# Patient Record
Sex: FEMALE | Race: AMERICAN INDIAN OR ALASKA NATIVE
[De-identification: names, ages, dates, MRNs, and addresses within clinical notes are randomized per-mention and may not be internally consistent; named-entity substitution may affect disease eponyms.]

---

## 2022-05-26 ENCOUNTER — HOSPITAL ENCOUNTER (INPATIENT)
Dept: HOSPITAL 5 - ED | Age: 77
LOS: 6 days | Discharge: HOME HEALTH | DRG: 64 | End: 2022-06-01
Attending: INTERNAL MEDICINE | Admitting: INTERNAL MEDICINE
Payer: MEDICARE

## 2022-05-26 DIAGNOSIS — N39.0: ICD-10-CM

## 2022-05-26 DIAGNOSIS — M10.9: ICD-10-CM

## 2022-05-26 DIAGNOSIS — I10: ICD-10-CM

## 2022-05-26 DIAGNOSIS — G81.91: ICD-10-CM

## 2022-05-26 DIAGNOSIS — Z82.49: ICD-10-CM

## 2022-05-26 DIAGNOSIS — E78.00: ICD-10-CM

## 2022-05-26 DIAGNOSIS — Z79.899: ICD-10-CM

## 2022-05-26 DIAGNOSIS — Z90.710: ICD-10-CM

## 2022-05-26 DIAGNOSIS — G92.8: ICD-10-CM

## 2022-05-26 DIAGNOSIS — R65.10: ICD-10-CM

## 2022-05-26 DIAGNOSIS — E78.5: ICD-10-CM

## 2022-05-26 DIAGNOSIS — E11.9: ICD-10-CM

## 2022-05-26 DIAGNOSIS — F01.50: ICD-10-CM

## 2022-05-26 DIAGNOSIS — Z83.3: ICD-10-CM

## 2022-05-26 DIAGNOSIS — I63.89: Primary | ICD-10-CM

## 2022-05-26 DIAGNOSIS — E86.9: ICD-10-CM

## 2022-05-26 DIAGNOSIS — R29.704: ICD-10-CM

## 2022-05-26 DIAGNOSIS — I72.8: ICD-10-CM

## 2022-05-26 LAB
ALBUMIN SERPL-MCNC: 3.9 G/DL (ref 3.9–5)
ALT SERPL-CCNC: 15 UNITS/L (ref 7–56)
APTT BLD: 31.5 SEC. (ref 24.2–36.6)
BASOPHILS # (AUTO): 0.1 K/MM3 (ref 0–0.1)
BASOPHILS NFR BLD AUTO: 0.7 % (ref 0–1.8)
BUN SERPL-MCNC: 32 MG/DL (ref 7–17)
BUN/CREAT SERPL: 29 %
CALCIUM SERPL-MCNC: 9.1 MG/DL (ref 8.4–10.2)
EOSINOPHIL # BLD AUTO: 0.3 K/MM3 (ref 0–0.4)
EOSINOPHIL NFR BLD AUTO: 2.4 % (ref 0–4.3)
HCT VFR BLD CALC: 43.3 % (ref 30.3–42.9)
HEMOLYSIS INDEX: 11
HGB BLD-MCNC: 13.7 GM/DL (ref 10.1–14.3)
INR PPP: 0.96 (ref 0.87–1.13)
LYMPHOCYTES # BLD AUTO: 2.7 K/MM3 (ref 1.2–5.4)
LYMPHOCYTES NFR BLD AUTO: 21.5 % (ref 13.4–35)
MCHC RBC AUTO-ENTMCNC: 32 % (ref 30–34)
MCV RBC AUTO: 83 FL (ref 79–97)
MONOCYTES # (AUTO): 0.9 K/MM3 (ref 0–0.8)
MONOCYTES % (AUTO): 7.1 % (ref 0–7.3)
PLATELET # BLD: 258 K/MM3 (ref 140–440)
RBC # BLD AUTO: 5.24 M/MM3 (ref 3.65–5.03)

## 2022-05-26 PROCEDURE — 81001 URINALYSIS AUTO W/SCOPE: CPT

## 2022-05-26 PROCEDURE — 83036 HEMOGLOBIN GLYCOSYLATED A1C: CPT

## 2022-05-26 PROCEDURE — 80061 LIPID PANEL: CPT

## 2022-05-26 PROCEDURE — 93880 EXTRACRANIAL BILAT STUDY: CPT

## 2022-05-26 PROCEDURE — 85670 THROMBIN TIME PLASMA: CPT

## 2022-05-26 PROCEDURE — 70450 CT HEAD/BRAIN W/O DYE: CPT

## 2022-05-26 PROCEDURE — 82962 GLUCOSE BLOOD TEST: CPT

## 2022-05-26 PROCEDURE — 71045 X-RAY EXAM CHEST 1 VIEW: CPT

## 2022-05-26 PROCEDURE — 85025 COMPLETE CBC W/AUTO DIFF WBC: CPT

## 2022-05-26 PROCEDURE — 84484 ASSAY OF TROPONIN QUANT: CPT

## 2022-05-26 PROCEDURE — 80048 BASIC METABOLIC PNL TOTAL CA: CPT

## 2022-05-26 PROCEDURE — 70496 CT ANGIOGRAPHY HEAD: CPT

## 2022-05-26 PROCEDURE — 80053 COMPREHEN METABOLIC PANEL: CPT

## 2022-05-26 PROCEDURE — 70498 CT ANGIOGRAPHY NECK: CPT

## 2022-05-26 PROCEDURE — 85730 THROMBOPLASTIN TIME PARTIAL: CPT

## 2022-05-26 PROCEDURE — 84443 ASSAY THYROID STIM HORMONE: CPT

## 2022-05-26 PROCEDURE — 82607 VITAMIN B-12: CPT

## 2022-05-26 PROCEDURE — 87040 BLOOD CULTURE FOR BACTERIA: CPT

## 2022-05-26 PROCEDURE — 85610 PROTHROMBIN TIME: CPT

## 2022-05-26 PROCEDURE — 93306 TTE W/DOPPLER COMPLETE: CPT

## 2022-05-26 PROCEDURE — 70551 MRI BRAIN STEM W/O DYE: CPT

## 2022-05-26 PROCEDURE — 36415 COLL VENOUS BLD VENIPUNCTURE: CPT

## 2022-05-26 PROCEDURE — C8929 TTE W OR WO FOL WCON,DOPPLER: HCPCS

## 2022-05-26 RX ADMIN — Medication SCH ML: at 21:38

## 2022-05-26 NOTE — HISTORY AND PHYSICAL REPORT
History of Present Illness


Chief complaint: 


She is confused and weak today





History of present illness: 


75 YO Female with CVA with RHP on Antiplatelet therapy, HTN, HLD, Gout, Vascular

Dementia, Cerebral Atherosclerosis, DM presents ED for evaluation.  Patient has 

diminished cognition and is unable to provide detailed history.  Patient history

provided by patient caregivers who are at bedside during exam and interview.  As

per caregiver the patient was found to have increased weakness and confusion 

today.  Caregivers reported that they suspected the patient may have had another

stroke.  Patient transported to [facility] via private vehicle for the care and 

evaluation of the aforementioned symptoms.  The patient was seen and evaluated 

in the emergency department.  All lab and imaging studies reviewed.  Patient 

found to have urinary tract infection complicated by systemic inflammatory 

response syndrome, metabolic encephalopathy, as well as volume depletion.  

Patient initiated on CVA protocol without evidence of new CVA.  Teleneurology 

consulted.  No reports of fever, chills, chest pain, palpitation, productive c

ough, skin rash, recent contact, known exposure to COVID-19.  No prior admission

for review.  All medication listed at time of admission as reconciled.  Advanced

care planning conducted in ED.  Patient has diminished cognition but has a 

positive gag reflex and is able to protect her airway without difficulty at the 

time my evaluation.





Past History


Past Medical History: diabetes, hypertension, hyperlipidemia, stroke


Past Surgical History: hysterectomy


Social history: .  denies: smoking, alcohol abuse, prescription drug 

abuse


Family history: diabetes, hypertension





Medications and Allergies


                                    Allergies











Allergy/AdvReac Type Severity Reaction Status Date / Time


 


No Known Allergies Allergy   Verified 07/07/15 10:51











                                Home Medications











 Medication  Instructions  Recorded  Confirmed  Last Taken  Type


 


Aspirin [Aspirin BABY CHEW TAB] 81 mg DAILY 01/25/21 01/25/21 01/24/21 History


 


Metformin HCl [metFORMIN  mg PO BID 01/25/21 01/25/21 01/24/21 History





Osmotic]     


 


lisinopriL [Zestril TAB] 20 mg PO QDAY 01/25/21 01/25/21 01/24/21 History


 


ALBUTEROL NEB's [Proventil 0.083% 2.5 mg IH Q3HRT PRN  nebu 01/28/21  Unknown Rx





NEBS]     


 


Amlodipine Besylate [Norvasc] 10 mg PO DAILY #30 tab 01/28/21  Unknown Rx


 


AtorvaSTATin 10 mg PO QHS #30 tab 01/28/21 01/25/21 01/24/21 Rx


 


Insulin Glargine [Lantus VIAL] 30 units SUB-Q QHS #15 ml 01/28/21  Unknown Rx


 


Lispro Insulin [HumaLOG] 12 unit SUB-Q AC #15 ml 01/28/21  Unknown Rx


 


Syringe & Needle,[facility] 01/28/21  Unknown 

Rx





[Ultra Comfort]     


 


allopurinoL [Zyloprim] 300 mg PO QDAY #30 tab 01/28/21 01/25/21 01/24/21 Rx


 


Insulin Glargine [Lantus VIAL] 30 units SUB-Q QHS #15 ml 02/01/21  Unknown Rx


 


Lispro Insulin [HumaLOG] 12 unit SQ AC #15 ml 02/01/21  Unknown Rx


 


Syringe & Needle,Insulin,1 ml 1 each  ACHS #100 disp.syrin 02/01/21  Unknown 

Rx





[Ultra Comfort]     











Active Meds: 


Active Medications





Sodium Chloride (Nacl 0.9% 1000 Ml)  1,000 mls @ 999 mls/hr IV BOLUS ONE


   Stop: 05/26/22 21:26


Ceftriaxone Sodium (Rocephin/Ns 1 Gm/50 Ml)  1 gm in 50 mls @ 100 mls/hr IV ONCE

ONE


   Stop: 05/26/22 21:29











Review of Systems


ROS unobtainable: due to mental status





Exam





- Constitutional


Vitals: 


                                        











Temp Pulse Resp BP Pulse Ox


 


 97 F L  97 H  18   186/89   96 


 


 05/26/22 19:03  05/26/22 19:03  05/26/22 19:03  05/26/22 19:03  05/26/22 19:03











General appearance: Present: mild distress





- EENT


Eyes: Present: PERRL


ENT: hearing intact, clear oral mucosa





- Neck


Neck: Present: supple





- Respiratory


Respiratory effort: normal


Respiratory: bilateral: diminished





- Cardiovascular


Heart Sounds: Present: S1 & S2.  Absent: rub, click





- Extremities


Extremities: pulses symmetrical, No edema


Peripheral Pulses: within normal limits





- Abdominal


General gastrointestinal: Present: soft, non-tender, non-distended, normal bowel

sounds


Female genitourinary: Present: normal





- Integumentary


Integumentary: Present: clear, dry, clammy





- Musculoskeletal


Musculoskeletal: right sided weakness





- Psychiatric


Psychiatric: no appropriate mood/affect, no intact judgment & insight, no memory

intact





- Neurologic


Neurologic: CNII-XII intact, focal deficits, no gait normal





HEART Score





- HEART Score


Troponin: 


                                        











Troponin T  < 0.010 ng/mL (0.00-0.029)   05/26/22  19:41    














Results





- Labs


CBC & Chem 7: 


                                 05/26/22 19:41





                                 05/26/22 19:41


Labs: 


                              Abnormal lab results











  05/26/22 05/26/22 05/26/22 Range/Units





  19:41 19:41 19:41 


 


WBC  12.7 H    (4.5-11.0)  K/mm3


 


RBC  5.24 H    (3.65-5.03)  M/mm3


 


Hct  43.3 H    (30.3-42.9)  %


 


MCH  26 L    (28-32)  pg


 


Mono # (Auto)  0.9 H    (0.0-0.8)  K/mm3


 


Seg Neutrophils #  8.7 H    (1.8-7.7)  K/mm3


 


Thrombin Time   21.1 H   (15.1-19.6)  Sec.


 


Chloride    110.2 H  ()  mmol/L


 


BUN    32 H  (7-17)  mg/dL


 


Glucose    134 H  ()  mg/dL














Assessment and Plan





- Patient Problems


(1) Systemic inflammatory response syndrome


Status: Acute   


Plan to address problem: 


CBC, CMP, urinalysis, chest x-ray, IV fluid resuscitation therapy, IV antibiotic

therapy, supportive care.








(2) UTI (urinary tract infection)


Status: Acute   


Qualifiers: 


   Encounter type: initial encounter 


Plan to address problem: 


CBC, urinalysis, IV antibiotic therapy, repeat CBC in a.m.








(3) Metabolic encephalopathy


Status: Acute   


Plan to address problem: 


CT head, neuro check, seizure precaution, aspiration precaution, fall 

precautions








(4) Vascular dementia


Status: Acute   


Qualifiers: 


   Dementia behavioral disturbance: without behavioral disturbance   Qualified 

Code(s): F01.50 - Vascular dementia without behavioral disturbance   


Plan to address problem: 


Verbal prompting, verbal redirection, benzodiazepine therapy as clinically 

indicated.








(5) Cerebral atherosclerosis


Status: Acute   


Plan to address problem: 


Risk factor reduction, antiplatelet therapy, supportive care.








(6) Volume depletion


Status: Acute   


Plan to address problem: 


IV fluid resuscitation therapy, monitor urine output every shift, monitor fluid 

balance.








(7) DVT prophylaxis


Status: Acute   


Plan to address problem: 


SCD to bilateral lower extremities while in bed








(8) Advance care planning


Status: Acute   


Plan to address problem: 


Disease education done, care plan discussed, diagnoses discussed, prognosis 

discussed, patient is full code.  Patient caregivers acknowledged understanding 

and agreement with care plan, +30 minutes.








(9) Preventative health care


Status: Acute   


Plan to address problem: 


Patient caregiver was counseled regarding risk factor reduction, home safety, 

outpatient follow-up with primary care physician for all age and risk factor 

appropriate screening test.

## 2022-05-26 NOTE — CAT SCAN REPORT
CT head/brain wo con



INDICATION:

acute stroke.



TECHNIQUE: Routine CT head. All CT scans at this location are performed using CT dose reduction for A
DAVION by means of automated exposure control.



COMPARISON: 

January 25 2021



FINDINGS:



Intracranial: Gray-white matter differentiation is maintained. No intracranial hemorrhage. No extra a
xial collection. No hydrocephalus. No herniation. In the left cerebellar lacunar remote basal ganglia
, right thalamic, and left cerebellar lacunar infarctions. Significant periventricular and centrum se
miovale white matter hypoattenuation most consistent with sequela of chronic microvascular disease. E
ncephalomalacia in the right frontal lobe and right insular lobe from prior infarctions.

Sinuses: Significant mucosal thickening seen within the left maxillary sinus with suspected underlyin
g mucous retention cysts.

Orbits: Globes are intact. 

Calvarium: No acute fracture.





IMPRESSION:

1.  No acute intracranial abnormality.



2. Severe sequela from chronic microvascular disease and remote multifocal infarctions.



Signer Name: Quan Campos MD 

Signed: 5/26/2022 7:50 PM

Workstation Name: [id number]

## 2022-05-26 NOTE — EMERGENCY DEPARTMENT REPORT
Blank Doc





- Documentation


Documentation: 





[facility] Consult Note





# Demographics


Consult Type: Acute Stroke Level 2 (4.5-24 hrs)





Patient Location: Emergency Room





First Name: You





Last Name: Cristian





YOB: 1945





Age: 76





Gender: Female





Facility: [facility]





Time of Initial Page (Eastern Time): 05/26/2022, 19:12





Time of Return Call (Eastern Time): 05/26/2022, 19:12








# HPI


History: 2100 last night was last well. Some slurred speech and difficulty 

ambulating








# Scores


Time of exam and NIHSS (Eastern Time): 05/26/2022, 19:40





Level of Consciousness 1a: [0] = Alert; keenly responsive





LOC Questions 1b: [2] = Answers neither correctly





LOC Commands 1c: [0] = Performs both tasks correctly





Best Gaze 2: [0] = Normal





Visual 3: [0] = No visual loss





Facial Palsy 4: [1] = Minor paralysis





Motor Arm Left 5a: [0] = No drift





Motor Arm Right 5b: [0] = No drift





Motor Leg Left 6a: [0] = No drift





Motor Leg Right 6b: [0] = No drift





Limb Ataxia 7: [0] = Absent





Sensory 8: [0] = Normal





Best Language 9: [0] = No aphasia





Dysarthria 10: [1] = Mild-to-moderate dysarthria





Extinction and Inattention 11: [0] = No abnormality





NIHSS Total: 4








# Assessment


Impression:


Altered Mental Status








# Plan


Thrombolytic/Intervention: NOT IV Thrombolysis or IA Intervention candidate





Thrombolytic Exclusion: > 4.5 hours





Intraarterial Exclusion:


clinically not consistent with stroke





Other:


I have discussed my recommendations with the referring provider





Additional Recommendations: Metabolic and infectious evaluation recommended. If 

no cause found then MRI brain and EEG would be reasonable for next step in 

evaluation








# Logistics


Telemedicine: Interactive 2 way audio and visual telecommunication technology 

was utilized during this visit

## 2022-05-26 NOTE — EMERGENCY DEPARTMENT REPORT
HPI





- General


Chief Complaint: Neuro Symptoms/Deficit


Time Seen by Provider: 05/26/22 19:15





- HPI


HPI: 


Time last known well was May 25, 2022 at 9 PM.  Apparently since this morning 

the caretakers thought that there is something wrong with her because her m

entation is not the same and she seems confused and having trouble speaking with

trouble getting her words out and also some slurring of the words.  The patient 

is usually able to walk and today has not been able to.  It is difficult to 

communicate with the patient but she denies any current complaints and does not 

know why she is in the emergency department.  She does deny nausea vomiting 

fever chills headache or any other associated symptoms.





ED Past Medical Hx





- Past Medical History


Hx Hypertension: Yes


Hx Diabetes: Yes


Hx Dementia: Yes (vascular dementia)


Additional medical history: Gout, high cholesterol





- Surgical History


Past Surgical History?: Yes


Additional Surgical History: hysterectomy





- Family History


Family history: no significant





- Social History


Smoking Status: Never Smoker


Substance Use Type: None





- Medications


Home Medications: 


                                Home Medications











 Medication  Instructions  Recorded  Confirmed  Last Taken  Type


 


Aspirin [Aspirin BABY CHEW TAB] 81 mg DAILY 01/25/21 01/25/21 01/24/21 History


 


Metformin HCl [metFORMIN  mg PO BID 01/25/21 01/25/21 01/24/21 History





Osmotic]     


 


lisinopriL [Zestril TAB] 20 mg PO QDAY 01/25/21 01/25/21 01/24/21 History


 


ALBUTEROL NEB's [Proventil 0.083% 2.5 mg IH Q3HRT PRN  nebu 01/28/21  Unknown Rx





NEBS]     


 


Amlodipine Besylate [Norvasc] 10 mg PO DAILY #30 tab 01/28/21  Unknown Rx


 


AtorvaSTATin 10 mg PO QHS #30 tab 01/28/21 01/25/21 01/24/21 Rx


 


Insulin Glargine [Lantus VIAL] 30 units SUB-Q QHS #15 ml 01/28/21  Unknown Rx


 


Lispro Insulin [HumaLOG] 12 unit SUB-Q AC #15 ml 01/28/21  Unknown Rx


 


Syringe & Needle,Insulin,1 ml 1 each  [facility] #100 disp.syrin 01/28/21  Unknown 

Rx





[Ultra Comfort]     


 


allopurinoL [Zyloprim] 300 mg PO QDAY #30 tab 01/28/21 01/25/21 01/24/21 Rx


 


Insulin Glargine [Lantus VIAL] 30 units SUB-Q QHS #15 ml 02/01/21  Unknown Rx


 


Lispro Insulin [HumaLOG] 12 unit SQ AC #15 ml 02/01/21  Unknown Rx


 


Syringe & Needle,Insulin,1 ml 1 each  [facility] #100 disp.syrin 02/01/21  Unknown 

Rx





[Ultra Comfort]     














ED Review of Systems


ROS: 


Stated complaint: POSS STOKE


Other details as noted in HPI





Other: 





All systems reviewed and negative





Physical Exam





- Physical Exam


Vital Signs: 


                                   Vital Signs











  05/26/22





  19:03


 


Temperature 97 F L


 


Pulse Rate 97 H


 


Respiratory 18





Rate 


 


Blood Pressure 186/89





[Left] 


 


O2 Sat by Pulse 96





Oximetry 











Physical Exam: 





Physical Exam:


Constitutional: AAOX3. No acute distress. No diaphoresis. 


HENT: Normocephalic. Pupils equal and reactive. No throat edema or erythema.


Neck: No neck rigidity or tenderness.


Cardiovascular: 


Heart sounds: No murmur. Normal rate and regular rhythm. 


Pulses: Intact distal pulses. 


Lungs: No wheezing or rales. 


Chest wall: No tenderness. 


Abdominal: No distension. No mass/pulsatile mass. No abdominal tenderness, 

guarding nor rebound. 


Musculoskeletal: Normal range of motion. No edema, No calf TTP.


Skin: Warm and dry. 


Neurological: Alert and oriented to person, place, and time.  NIH stroke scale 

equals 5 for mild right facial paralysis with a flat nasolabial fold, ataxia in 

bilateral upper extremities, mild to moderate aphasia, mild dysarthria.


Psychiatric: Mood and affect normal. Normal cognition and memory. Normal 

judgement.








ED Course


                                   Vital Signs











  05/26/22





  19:03


 


Temperature 97 F L


 


Pulse Rate 97 H


 


Respiratory 18





Rate 


 


Blood Pressure 186/89





[Left] 


 


O2 Sat by Pulse 96





Oximetry 














- Reevaluation(s)


Reevaluation #1: 





05/26/22 19:24


At 1920 I spoke to Rosmery Reddy, the telemetry neurologist who will be 

evaluated the patient once her CAT scan of the brain comes back.


Reevaluation #2: 





05/26/22 20:25


I spoke to the telemetry neurologist who did not think that this was necessarily

 a stroke.  Anyway she is currently out of the tPA window.  She diagnosed her 

with altered mental status.  At this time her chest x-ray is normal her 

chemistries are all within normal limits I am awaiting on a urinalysis that we 

can a catheter for and then she will be admitted for altered mental status.


05/26/22 20:26





Reevaluation #3: 





05/26/22 20:55


We are awaiting a urine since her white count is elevated.  I spoke to Dr. Montero

 who will be placing admission orders.  Likely this could be metabolic but a she

 could have had a small stroke.





ED Medical Decision Making





- Lab Data


Result diagrams: 


                                 05/26/22 19:41





                                 05/26/22 19:41


Critical care attestation.: 


If time is entered above; I have spent that time in minutes in the direct care 

of this critically ill patient, excluding procedure time.








ED Disposition


Clinical Impression: 


 Altered mental status





Disposition: [facility]M [facility]


Is pt being admited?: Yes


Does the pt Need Aspirin: Yes


Condition: Stable

## 2022-05-26 NOTE — XRAY REPORT
CHEST 1 VIEW 5/26/2022 7:51 PM



INDICATION / CLINICAL INFORMATION: stroke.



COMPARISON: 1/25/2021



FINDINGS:



SUPPORT DEVICES: None.



HEART / MEDIASTINUM: No significant abnormality. 



LUNGS / PLEURA: No significant pulmonary or pleural abnormality. No pneumothorax. 



ADDITIONAL FINDINGS: No significant additional findings.



IMPRESSION:

1. No acute findings.



Signer Name: Sammy Busby MD 

Signed: 5/26/2022 8:08 PM

Workstation Name: [id number]

## 2022-05-27 LAB
BASOPHILS # (AUTO): 0 K/MM3 (ref 0–0.1)
BASOPHILS NFR BLD AUTO: 0.5 % (ref 0–1.8)
BILIRUB UR QL STRIP: (no result)
BLOOD UR QL VISUAL: (no result)
BUN SERPL-MCNC: 29 MG/DL (ref 7–17)
BUN/CREAT SERPL: 29 %
CALCIUM SERPL-MCNC: 8.5 MG/DL (ref 8.4–10.2)
EOSINOPHIL # BLD AUTO: 0.3 K/MM3 (ref 0–0.4)
EOSINOPHIL NFR BLD AUTO: 2.9 % (ref 0–4.3)
HCT VFR BLD CALC: 41 % (ref 30.3–42.9)
HEMOLYSIS INDEX: 61
HGB BLD-MCNC: 13.1 GM/DL (ref 10.1–14.3)
LYMPHOCYTES # BLD AUTO: 2 K/MM3 (ref 1.2–5.4)
LYMPHOCYTES NFR BLD AUTO: 18.9 % (ref 13.4–35)
MCHC RBC AUTO-ENTMCNC: 32 % (ref 30–34)
MCV RBC AUTO: 83 FL (ref 79–97)
MONOCYTES # (AUTO): 0.8 K/MM3 (ref 0–0.8)
MONOCYTES % (AUTO): 7.2 % (ref 0–7.3)
PH UR STRIP: 5 [PH] (ref 5–7)
PLATELET # BLD: 247 K/MM3 (ref 140–440)
PROT UR STRIP-MCNC: (no result) MG/DL
RBC # BLD AUTO: 4.95 M/MM3 (ref 3.65–5.03)
RBC #/AREA URNS HPF: < 1 /HPF (ref 0–6)
UROBILINOGEN UR-MCNC: < 2 MG/DL (ref ?–2)
WBC #/AREA URNS HPF: < 1 /HPF (ref 0–6)

## 2022-05-27 RX ADMIN — INSULIN LISPRO SCH: 100 INJECTION, SOLUTION INTRAVENOUS; SUBCUTANEOUS at 17:14

## 2022-05-27 RX ADMIN — INSULIN LISPRO SCH UNIT: 100 INJECTION, SOLUTION INTRAVENOUS; SUBCUTANEOUS at 12:36

## 2022-05-27 RX ADMIN — SODIUM CHLORIDE SCH MLS/HR: 0.9 INJECTION, SOLUTION INTRAVENOUS at 07:31

## 2022-05-27 RX ADMIN — INSULIN LISPRO SCH: 100 INJECTION, SOLUTION INTRAVENOUS; SUBCUTANEOUS at 02:17

## 2022-05-27 RX ADMIN — LISINOPRIL SCH MG: 20 TABLET ORAL at 09:55

## 2022-05-27 RX ADMIN — SODIUM CHLORIDE SCH MLS/HR: 0.9 INJECTION, SOLUTION INTRAVENOUS at 23:24

## 2022-05-27 RX ADMIN — Medication SCH ML: at 21:53

## 2022-05-27 RX ADMIN — Medication SCH ML: at 09:55

## 2022-05-27 RX ADMIN — INSULIN LISPRO SCH: 100 INJECTION, SOLUTION INTRAVENOUS; SUBCUTANEOUS at 08:07

## 2022-05-27 NOTE — PROGRESS NOTE
Assessment and Plan


Assessment and plan: 





77 YO Female with CVA with RHP on Antiplatelet therapy, HTN, HLD, Gout, Vascular

Dementia, Cerebral Atherosclerosis, DM presents ED for evaluation of diminished 

cognition and was unable to provide detailed history.  Patient's history 

provided by patient caregivers who are at bedside during exam and interview.  As

per caregiver the patient was found to have increased weakness and confusion 

today.  Caregivers reported that they suspected the patient may have had another

stroke.  Patient transported to [facility] via private vehicle for the care and 

evaluation of the aforementioned symptoms.  The patient was seen and evaluated 

in the emergency department.  The patient was admitted with diagnosis below:





SIRS.  Patient meets criteria given the tachycardia, leukocytosis and altered 

mentation.  No signs of infection





Metabolic encephalopathy





Vascular dementia





Cerebral atherosclerosis





5/27/2022.  Urinalysis does not reveal any signs of infection.  We will follow-

up blood cultures for further evaluation.  Continue empiric antibiotics for now.

 We will proceed with further follow-up and rule out CVA.  Check MRI of brain.  

Patient appears to be back to her baseline mental status.  ?  TIA.





History


Interval history: 





No new issues overnight.





Hospitalist Physical





- Constitutional


Vitals: 


                                        











Temp Pulse Resp BP Pulse Ox


 


 97 F L  79   20   164/63   98 


 


 05/26/22 19:03  05/27/22 01:15  05/27/22 01:15  05/27/22 01:15  05/27/22 08:48











General appearance: Present: no acute distress





- EENT


Eyes: Present: PERRL, EOM intact


ENT: hearing intact, clear oral mucosa, dentition normal





- Neck


Neck: Present: supple, normal ROM





- Respiratory


Respiratory effort: normal


Respiratory: bilateral: CTA





- Cardiovascular


Rhythm: regular


Heart Sounds: Present: S1 & S2.  Absent: gallop, rub





- Extremities


Extremities: no ischemia, No edema, Full ROM





- Abdominal


General gastrointestinal: soft, non-tender, non-distended, normal bowel sounds





- Integumentary


Integumentary: Present: clear, warm, dry





- Neurologic


Neurologic: CNII-XII intact, moves all extremities





HEART Score





- HEART Score


Troponin: 


                                        











Troponin T  < 0.010 ng/mL (0.00-0.029)   05/26/22  19:41    














Results





- Labs


CBC & Chem 7: 


                                 05/27/22 05:23





                                 05/27/22 05:23


Labs: 


                             Laboratory Last Values











WBC  10.4 K/mm3 (4.5-11.0)   05/27/22  05:23    


 


RBC  4.95 M/mm3 (3.65-5.03)   05/27/22  05:23    


 


Hgb  13.1 gm/dl (10.1-14.3)   05/27/22  05:23    


 


Hct  41.0 % (30.3-42.9)   05/27/22  05:23    


 


MCV  83 fl (79-97)   05/27/22  05:23    


 


MCH  27 pg (28-32)  L  05/27/22  05:23    


 


MCHC  32 % (30-34)   05/27/22  05:23    


 


RDW  14.2 % (13.2-15.2)   05/27/22  05:23    


 


Plt Count  247 K/mm3 (140-440)   05/27/22  05:23    


 


Lymph % (Auto)  18.9 % (13.4-35.0)   05/27/22  05:23    


 


Mono % (Auto)  7.2 % (0.0-7.3)   05/27/22  05:23    


 


Eos % (Auto)  2.9 % (0.0-4.3)   05/27/22  05:23    


 


Baso % (Auto)  0.5 % (0.0-1.8)   05/27/22  05:23    


 


Lymph # (Auto)  2.0 K/mm3 (1.2-5.4)   05/27/22  05:23    


 


Mono # (Auto)  0.8 K/mm3 (0.0-0.8)   05/27/22  05:23    


 


Eos # (Auto)  0.3 K/mm3 (0.0-0.4)   05/27/22  05:23    


 


Baso # (Auto)  0.0 K/mm3 (0.0-0.1)   05/27/22  05:23    


 


Seg Neutrophils %  70.5 % (40.0-70.0)  H  05/27/22  05:23    


 


Seg Neutrophils #  7.3 K/mm3 (1.8-7.7)   05/27/22  05:23    


 


PT  13.8 Sec. (12.2-14.9)   05/26/22  19:41    


 


INR  0.96  (0.87-1.13)   05/26/22  19:41    


 


APTT  31.5 Sec. (24.2-36.6)   05/26/22  19:41    


 


Thrombin Time  21.1 Sec. (15.1-19.6)  H  05/26/22  19:41    


 


Sodium  144 mmol/L (137-145)   05/27/22  05:23    


 


Potassium  4.7 mmol/L (3.6-5.0)   05/27/22  05:23    


 


Chloride  112.8 mmol/L ()  H  05/27/22  05:23    


 


Carbon Dioxide  22 mmol/L (22-30)   05/27/22  05:23    


 


Anion Gap  14 mmol/L  05/27/22  05:23    


 


BUN  29 mg/dL (7-17)  H  05/27/22  05:23    


 


Creatinine  1.0 mg/dL (0.6-1.2)   05/27/22  05:23    


 


Estimated GFR  > 60 ml/min  05/27/22  05:23    


 


BUN/Creatinine Ratio  29 %  05/27/22  05:23    


 


Glucose  135 mg/dL ()  H  05/27/22  05:23    


 


POC Glucose  128 mg/dL ()  H  05/27/22  05:50    


 


Calcium  8.5 mg/dL (8.4-10.2)   05/27/22  05:23    


 


Total Bilirubin  0.30 mg/dL (0.1-1.2)   05/26/22  19:41    


 


AST  15 units/L (5-40)   05/26/22  19:41    


 


ALT  15 units/L (7-56)   05/26/22  19:41    


 


Alkaline Phosphatase  94 units/L ()   05/26/22  19:41    


 


Troponin T  < 0.010 ng/mL (0.00-0.029)   05/26/22  19:41    


 


Total Protein  7.0 g/dL (6.3-8.2)   05/26/22  19:41    


 


Albumin  3.9 g/dL (3.9-5)   05/26/22  19:41    


 


Albumin/Globulin Ratio  1.3 %  05/26/22  19:41    


 


Urine Color  Straw  (Yellow)   05/27/22  09:00    


 


Urine Turbidity  Clear  (Clear)   05/27/22  09:00    


 


Urine pH  5.0  (5.0-7.0)   05/27/22  09:00    


 


Ur Specific Gravity  1.012  (1.003-1.030)   05/27/22  09:00    


 


Urine Protein  <15 mg/dl mg/dL (Negative)   05/27/22  09:00    


 


Urine Glucose (UA)  >=500 mg/dL (Negative)   05/27/22  09:00    


 


Urine Ketones  Neg mg/dL (Negative)   05/27/22  09:00    


 


Urine Blood  Neg  (Negative)   05/27/22  09:00    


 


Urine Nitrite  Neg  (Negative)   05/27/22  09:00    


 


Urine Bilirubin  Neg  (Negative)   05/27/22  09:00    


 


Urine Urobilinogen  < 2.0 mg/dL (<2.0)   05/27/22  09:00    


 


Ur Leukocyte Esterase  Neg  (Negative)   05/27/22  09:00    


 


Urine WBC (Auto)  < 1.0 /HPF (0.0-6.0)   05/27/22  09:00    


 


Urine RBC (Auto)  < 1.0 /HPF (0.0-6.0)   05/27/22  09:00    


 


U Epithel Cells (Auto)  3.0 /HPF (0-13.0)   05/27/22  09:00    











Microbiology: 


Microbiology





05/26/22 20:47   Peripheral/Venous   Blood Culture - Preliminary


                            Culture in Progress


05/26/22 20:47   Peripheral/Venous   Blood Culture - Preliminary


                            Culture in Progress








[place]/IV: 


                                        





Voiding Method                   External Female Catheter











Active Medications





- Current Medications


Current Medications: 














Generic Name Dose Route Start Last Admin





  Trade Name Freq  PRN Reason Stop Dose Admin


 


Acetaminophen  650 mg  05/26/22 20:55  05/26/22 21:37





  Acetaminophen 325 Mg Tab  PO   650 mg





  Q4H PRN   Administration





  Pain MILD(1-3)/Fever >100.5/HA  


 


Albuterol  2.5 mg  05/26/22 20:55 





  Albuterol 2.5 Mg/3 Ml Nebu  IH  





  Q4HRT PRN  





  Shortness Of Breath  


 


Allopurinol  300 mg  05/27/22 10:00  05/27/22 09:55





  Allopurinol 300 Mg Tab  PO   300 mg





  QDAY KARLIE   Administration


 


Amlodipine Besylate  10 mg  05/27/22 10:00  05/27/22 09:55





  Amlodipine 10 Mg Tab  PO   10 mg





  DAILY KARLIE   Administration


 


Atorvastatin Calcium  10 mg  05/26/22 22:00  05/27/22 02:49





  Atorvastatin 10 Mg Tab  PO   Not Given





  QHS KARLIE  


 


Dextrose  50 ml  05/26/22 20:58 





  Dextrose 50% In Water (25gm) 50 Ml Syringe  IV  





  Q30MIN PRN  





  Hypoglycemia  





  Protocol  


 


Hydromorphone HCl  0.5 mg  05/26/22 20:55 





  Hydromorphone 0.5 Mg/0.5 Ml Inj  IV  





  Q23H PRN  





  Pain , Severe (7-10)  


 


Sodium Chloride  1,000 mls @ 75 mls/hr  05/26/22 21:00  05/27/22 07:31





  Nacl 0.9% 1000 Ml  IV   75 mls/hr





  AS DIRECT KARLIE   Administration


 


Levofloxacin/Dextrose  500 mg in 100 mls @ 100 mls/hr  05/26/22 21:00  05/27/22 

02:50





  Levaquin 500mg/100ml  IV   100 mls/hr





  Q24H KARLIE   Infusion





  Protocol  


 


Insulin Human Lispro  0 unit  05/27/22 00:00  05/27/22 08:07





  Insulin Lispro 100 Unit/Ml  SUB-Q   Not Given





  Q6HR KARLIE  





  Protocol  


 


Lisinopril  20 mg  05/27/22 10:00  05/27/22 09:55





  Lisinopril 20 Mg Tab  PO   20 mg





  QDAY KARLIE   Administration


 


Ondansetron HCl  4 mg  05/26/22 20:55  05/26/22 21:35





  Ondansetron 4 Mg/2 Ml Inj  IV   4 mg





  Q8H PRN   Administration





  Nausea And Vomiting  


 


Oxycodone/Acetaminophen  1 tab  05/26/22 20:55 





  Oxycodone /Acetaminophen 5-325mg Tab  PO  





  Q16H PRN  





  Pain, Moderate (4-6)  


 


Sodium Chloride  10 ml  05/26/22 22:00  05/27/22 09:55





  Sodium Chloride 0.9% 10 Ml Flush Syringe  IV   10 ml





  BID KARLIE   Administration


 


Sodium Chloride  10 ml  05/26/22 20:55 





  Sodium Chloride 0.9% 10 Ml Flush Syringe  IV  





  PRN PRN  





  LINE FLUSH

## 2022-05-27 NOTE — MAGNETIC RESONANCE REPORT
MR brain wo con



INDICATION / CLINICAL INFORMATION:

76 years Female; AMS, r/o CVA. 



TECHNIQUE: 

Multiplanar, multisequence MR images of the brain were obtained. 



COMPARISON: 

None available.



FINDINGS:



BRAIN / INTRACRANIAL CONTENTS: There is extensive cerebral and pontine white matter disease most cons
istent with microvascular angiopathy. Additionally, there are areas of septal malacia involving right
 frontal, parietal and occipital lobes most consistent with old infarcts. More focal findings are see
n along the posterior left occipital lobes as well as within the basal ganglia and thalami.



If there is increase diffusion signal along the more anterior left thalamus measuring 1.4 cm greatest
 transverse dimension compatible with acute/subacute infarct. The diffusion imaging is otherwise unre
markable. Old infarct is seen within the left sari as well as along the posterior cerebellar hemisphe
res bilaterally.



There is moderate cerebral atrophy with associated prominence of the ventricular system. No extra-axi
al fluid collections or significant mass effect is identified. There also appear to be asymmetric chr
onic ischemic changes along the inferior right temporal lobe with relative atrophy of the right hippo
campus.



CRANIOCERVICAL JUNCTION: No significant abnormality.

VASCULAR FLOW-VOIDS: The intracranial ICAs and vertebrobasilar system grossly demonstrate appropriate
 signal voids.



ORBITS: No significant abnormality of visualized orbits.

SINUSES / MASTOIDS: There is heterogeneous signal involving medial left maxillary sinus extending to 
the adjacent left nasal cavity which may be on inflammatory basis though correlation would be needed 
given the heterogeneous signal. There is more homogeneous increased T2-weighted signal within the rem
aining left maxillary sinus most consistent with associated inflammatory process and mucosal thickeni
ng. Nodular heterogeneous signal extends within the posterior left nasal cavity.



ADDITIONAL FINDINGS: None. 



IMPRESSION:

1. There is a 1.4 cm acute infarct along the anterior left thalamus as detailed above.

2. There is otherwise extensive chronic microvascular angiopathy and multiple old infarcts, also desc
ribed above.

3. There is heterogeneous opacification of the left maxillary sinus with component extending within t
he adjacent left nasal cavity as described



Signer Name: Mikey Patricio MD 

Signed: 5/27/2022 2:58 PM

Workstation Name: [id number]

## 2022-05-28 LAB — HDLC SERPL-MCNC: 31 MG/DL (ref 40–59)

## 2022-05-28 RX ADMIN — INSULIN LISPRO SCH UNIT: 100 INJECTION, SOLUTION INTRAVENOUS; SUBCUTANEOUS at 00:23

## 2022-05-28 RX ADMIN — SODIUM CHLORIDE SCH MLS/HR: 0.9 INJECTION, SOLUTION INTRAVENOUS at 17:07

## 2022-05-28 RX ADMIN — INSULIN LISPRO SCH UNIT: 100 INJECTION, SOLUTION INTRAVENOUS; SUBCUTANEOUS at 12:00

## 2022-05-28 RX ADMIN — INSULIN LISPRO SCH UNIT: 100 INJECTION, SOLUTION INTRAVENOUS; SUBCUTANEOUS at 06:03

## 2022-05-28 RX ADMIN — Medication SCH ML: at 21:04

## 2022-05-28 RX ADMIN — INSULIN LISPRO SCH UNIT: 100 INJECTION, SOLUTION INTRAVENOUS; SUBCUTANEOUS at 18:00

## 2022-05-28 RX ADMIN — ASPIRIN SCH MG: 325 TABLET, COATED ORAL at 10:03

## 2022-05-28 RX ADMIN — LISINOPRIL SCH MG: 20 TABLET ORAL at 10:04

## 2022-05-28 RX ADMIN — Medication SCH ML: at 10:03

## 2022-05-28 NOTE — PROGRESS NOTE
Assessment and Plan


Assessment and plan: 





77 YO Female with CVA with RHP on Antiplatelet therapy, HTN, HLD, Gout, Vascular

Dementia, Cerebral Atherosclerosis, DM presents ED for evaluation of diminished 

cognition and was unable to provide detailed history.  Patient's history 

provided by patient caregivers who are at bedside during exam and interview.  As

per caregiver the patient was found to have increased weakness and confusion 

today.  Caregivers reported that they suspected the patient may have had another

stroke.  Patient transported to [facility] via private vehicle for the care and 

evaluation of the aforementioned symptoms.  The patient was seen and evaluated 

in the emergency department.  The patient was admitted with diagnosis below:





Acute left thalamic CVA.  





SIRS.  Patient meets criteria given the tachycardia, leukocytosis and altered 

mentation.  No signs of infection





Diabetes mellitus type 2





Hypertension.





Metabolic encephalopathy





Vascular dementia





Cerebral atherosclerosis





5/27/2022.  Urinalysis does not reveal any signs of infection.  We will follow-

up blood cultures for further evaluation.  Continue empiric antibiotics for now.

 We will proceed with further follow-up and rule out CVA.  Check MRI of brain.  

Patient appears to be back to her baseline mental status.  ?  TIA.





5/28/2022.  MRI reveals acute anterior left thalamic CVA measuring 1.4 cm.  We 

will initiate aspirin and Lipitor for secondary prevention.  Await 

echocardiogram results to rule out thromboembolic source.  Neurology has been 

consulted.  Await physical therapy evaluation.  Patient's BG has been stable not

requiring home Lantus insulin.  We will continue sliding scale regular insulin 

and monitor closely





History


Interval history: 





No new issues overnight.





Hospitalist Physical





- Constitutional


Vitals: 


                                        











Temp Pulse Resp BP Pulse Ox


 


 98.7 F   77   16   156/66   97 


 


 05/27/22 20:44  05/27/22 20:44  05/27/22 20:44  05/27/22 20:44  05/28/22 09:12











General appearance: Present: no acute distress





- EENT


Eyes: Present: PERRL, EOM intact


ENT: hearing intact, clear oral mucosa, dentition normal





- Neck


Neck: Present: supple, normal ROM





- Respiratory


Respiratory effort: normal


Respiratory: bilateral: CTA





- Cardiovascular


Rhythm: regular


Heart Sounds: Present: S1 & S2.  Absent: gallop, rub





- Extremities


Extremities: no ischemia, No edema, Full ROM





- Abdominal


General gastrointestinal: soft, non-tender, non-distended, normal bowel sounds





- Integumentary


Integumentary: Present: clear, warm, dry





- Neurologic


Neurologic: CNII-XII intact, moves all extremities





HEART Score





- HEART Score


Troponin: 


                                        











Troponin T  < 0.010 ng/mL (0.00-0.029)   05/26/22  19:41    














Results





- Labs


CBC & Chem 7: 


                                 05/27/22 05:23





                                 05/27/22 05:23


Labs: 


                             Laboratory Last Values











WBC  10.4 K/mm3 (4.5-11.0)   05/27/22  05:23    


 


RBC  4.95 M/mm3 (3.65-5.03)   05/27/22  05:23    


 


Hgb  13.1 gm/dl (10.1-14.3)   05/27/22  05:23    


 


Hct  41.0 % (30.3-42.9)   05/27/22  05:23    


 


MCV  83 fl (79-97)   05/27/22  05:23    


 


MCH  27 pg (28-32)  L  05/27/22  05:23    


 


MCHC  32 % (30-34)   05/27/22  05:23    


 


RDW  14.2 % (13.2-15.2)   05/27/22  05:23    


 


Plt Count  247 K/mm3 (140-440)   05/27/22  05:23    


 


Lymph % (Auto)  18.9 % (13.4-35.0)   05/27/22  05:23    


 


Mono % (Auto)  7.2 % (0.0-7.3)   05/27/22  05:23    


 


Eos % (Auto)  2.9 % (0.0-4.3)   05/27/22  05:23    


 


Baso % (Auto)  0.5 % (0.0-1.8)   05/27/22  05:23    


 


Lymph # (Auto)  2.0 K/mm3 (1.2-5.4)   05/27/22  05:23    


 


Mono # (Auto)  0.8 K/mm3 (0.0-0.8)   05/27/22  05:23    


 


Eos # (Auto)  0.3 K/mm3 (0.0-0.4)   05/27/22  05:23    


 


Baso # (Auto)  0.0 K/mm3 (0.0-0.1)   05/27/22  05:23    


 


Seg Neutrophils %  70.5 % (40.0-70.0)  H  05/27/22  05:23    


 


Seg Neutrophils #  7.3 K/mm3 (1.8-7.7)   05/27/22  05:23    


 


PT  13.8 Sec. (12.2-14.9)   05/26/22  19:41    


 


INR  0.96  (0.87-1.13)   05/26/22  19:41    


 


APTT  31.5 Sec. (24.2-36.6)   05/26/22  19:41    


 


Thrombin Time  21.1 Sec. (15.1-19.6)  H  05/26/22  19:41    


 


Sodium  144 mmol/L (137-145)   05/27/22  05:23    


 


Potassium  4.7 mmol/L (3.6-5.0)   05/27/22  05:23    


 


Chloride  112.8 mmol/L ()  H  05/27/22  05:23    


 


Carbon Dioxide  22 mmol/L (22-30)   05/27/22  05:23    


 


Anion Gap  14 mmol/L  05/27/22  05:23    


 


BUN  29 mg/dL (7-17)  H  05/27/22  05:23    


 


Creatinine  1.0 mg/dL (0.6-1.2)   05/27/22  05:23    


 


Estimated GFR  > 60 ml/min  05/27/22  05:23    


 


BUN/Creatinine Ratio  29 %  05/27/22  05:23    


 


Glucose  135 mg/dL ()  H  05/27/22  05:23    


 


POC Glucose  167 mg/dL ()  H  05/28/22  05:54    


 


Calcium  8.5 mg/dL (8.4-10.2)   05/27/22  05:23    


 


Total Bilirubin  0.30 mg/dL (0.1-1.2)   05/26/22  19:41    


 


AST  15 units/L (5-40)   05/26/22  19:41    


 


ALT  15 units/L (7-56)   05/26/22  19:41    


 


Alkaline Phosphatase  94 units/L ()   05/26/22  19:41    


 


Troponin T  < 0.010 ng/mL (0.00-0.029)   05/26/22  19:41    


 


Total Protein  7.0 g/dL (6.3-8.2)   05/26/22  19:41    


 


Albumin  3.9 g/dL (3.9-5)   05/26/22  19:41    


 


Albumin/Globulin Ratio  1.3 %  05/26/22  19:41    


 


Urine Color  Straw  (Yellow)   05/27/22  09:00    


 


Urine Turbidity  Clear  (Clear)   05/27/22  09:00    


 


Urine pH  5.0  (5.0-7.0)   05/27/22  09:00    


 


Ur Specific Gravity  1.012  (1.003-1.030)   05/27/22  09:00    


 


Urine Protein  <15 mg/dl mg/dL (Negative)   05/27/22  09:00    


 


Urine Glucose (UA)  >=500 mg/dL (Negative)   05/27/22  09:00    


 


Urine Ketones  Neg mg/dL (Negative)   05/27/22  09:00    


 


Urine Blood  Neg  (Negative)   05/27/22  09:00    


 


Urine Nitrite  Neg  (Negative)   05/27/22  09:00    


 


Urine Bilirubin  Neg  (Negative)   05/27/22  09:00    


 


Urine Urobilinogen  < 2.0 mg/dL (<2.0)   05/27/22  09:00    


 


Ur Leukocyte Esterase  Neg  (Negative)   05/27/22  09:00    


 


Urine WBC (Auto)  < 1.0 /HPF (0.0-6.0)   05/27/22  09:00    


 


Urine RBC (Auto)  < 1.0 /HPF (0.0-6.0)   05/27/22  09:00    


 


U Epithel Cells (Auto)  3.0 /HPF (0-13.0)   05/27/22  09:00    











Microbiology: 


Microbiology





05/26/22 20:47   Peripheral/Venous   Blood Culture - Preliminary


                            NO GROWTH AFTER 24 HOURS


05/26/22 20:47   Peripheral/Venous   Blood Culture - Preliminary


                            NO GROWTH AFTER 24 HOURS








[place]/IV: 


                                        





Voiding Method                   External Female Catheter











Active Medications





- Current Medications


Current Medications: 














Generic Name Dose Route Start Last Admin





  Trade Name Freq  PRN Reason Stop Dose Admin


 


Acetaminophen  650 mg  05/26/22 20:55  05/26/22 21:37





  Acetaminophen 325 Mg Tab  PO   650 mg





  Q4H PRN   Administration





  Pain MILD(1-3)/Fever >100.5/HA  


 


Albuterol  2.5 mg  05/26/22 20:55 





  Albuterol 2.5 Mg/3 Ml Nebu  IH  





  Q4HRT PRN  





  Shortness Of Breath  


 


Allopurinol  300 mg  05/27/22 10:00  05/28/22 10:08





  Allopurinol 300 Mg Tab  PO   300 mg





  QDAY KARLIE   Administration


 


Amlodipine Besylate  10 mg  05/27/22 10:00  05/28/22 10:03





  Amlodipine 10 Mg Tab  PO   10 mg





  DAILY KARLIE   Administration


 


Aspirin  325 mg  05/28/22 10:00  05/28/22 10:03





  Aspirin Ec 325 Mg Tab  PO   325 mg





  QDAY KARLIE   Administration


 


Atorvastatin Calcium  40 mg  05/28/22 22:00 





  Atorvastatin 40 Mg Tab  PO  





  QHS KARLIE  


 


Dextrose  50 ml  05/26/22 20:58 





  Dextrose 50% In Water (25gm) 50 Ml Syringe  IV  





  Q30MIN PRN  





  Hypoglycemia  





  Protocol  


 


Hydromorphone HCl  0.5 mg  05/26/22 20:55 





  Hydromorphone 0.5 Mg/0.5 Ml Inj  IV  





  Q23H PRN  





  Pain , Severe (7-10)  


 


Sodium Chloride  1,000 mls @ 75 mls/hr  05/26/22 21:00  05/27/22 23:24





  Nacl 0.9% 1000 Ml  IV   75 mls/hr





  AS DIRECT KARLIE   Administration


 


Levofloxacin/Dextrose  500 mg in 100 mls @ 100 mls/hr  05/26/22 21:00  05/27/22 

21:52





  Levaquin 500mg/100ml  IV   100 mls/hr





  Q24H KARLIE   Administration





  Protocol  


 


Insulin Human Lispro  0 unit  05/27/22 00:00  05/28/22 06:03





  Insulin Lispro 100 Unit/Ml  SUB-Q   2 unit





  Q6HR KARLIE   Administration





  Protocol  


 


Lisinopril  20 mg  05/27/22 10:00  05/28/22 10:04





  Lisinopril 20 Mg Tab  PO   20 mg





  QDAY KARLIE   Administration


 


Ondansetron HCl  4 mg  05/26/22 20:55  05/26/22 21:35





  Ondansetron 4 Mg/2 Ml Inj  IV   4 mg





  Q8H PRN   Administration





  Nausea And Vomiting  


 


Oxycodone/Acetaminophen  1 tab  05/26/22 20:55 





  Oxycodone /Acetaminophen 5-325mg Tab  PO  





  Q16H PRN  





  Pain, Moderate (4-6)  


 


Sodium Chloride  10 ml  05/26/22 22:00  05/28/22 10:03





  Sodium Chloride 0.9% 10 Ml Flush Syringe  IV   10 ml





  BID KARLIE   Administration


 


Sodium Chloride  10 ml  05/26/22 20:55 





  Sodium Chloride 0.9% 10 Ml Flush Syringe  IV  





  PRN PRN  





  LINE FLUSH

## 2022-05-28 NOTE — CAT SCAN REPORT
CT angio neck



INDICATION / CLINICAL INFORMATION:

76 years Female; cva. 



TECHNIQUE: Thin cut axial images obtained through the head during IV bolus contrast administration. S
agittal, coronal, and 3 plane MIP reconstructions performed by the technologist. NASCET type criteria
 used evaluate stenoses. All CT scans at this location are performed using CT dose reduction for ALAR
A by means of automated exposure control. 



COMPARISON:

None available.



FINDINGS: 



CAROTID ARTERIES: There is atherosclerotic calcification involving proximal left ICA with 50% stenosi
s by NASCET to criteria.



There is small focus of calcification involving proximal right ICA with mild plaque. There is no sign
ificant stenosis by NASCET criteria. The remaining cervical carotid arteries appear unremarkable.



VERTEBRAL ARTERIES: The beam hardening artifact obscures the proximal vertebral arteries. However, th
ere is no clear evidence of significant focal stenosis involving the cervical vertebral arteries. The
 left vertebral artery is dominant. 



ARCH: The origins of the arch vessels are also obscured by the dense contrast within the adjacent ankit
ous structures. However, there is no clear significant narrowing of the visualized origins of the arc
h vessels.



ADDITIONAL FINDINGS: There is heterogeneous prominence of the left lobe of thyroid gland compatible w
ith goiter and correlation would be needed. 



IMPRESSION:



There is atherosclerotic calcification involving proximal left ICA with 50% stenosis by NASCET criter
ia.



There is mild plaque involving right carotid bifurcation without significant stenosis.



Signer Name: Mikey Patricio MD 

Signed: 5/28/2022 3:30 PM

Workstation Name: [id number]

## 2022-05-28 NOTE — CAT SCAN REPORT
CT angio head



INDICATION / CLINICAL INFORMATION:

76 years Female; CVA. 



TECHNIQUE: Thin cut axial images obtained through the head during IV bolus contrast administration. S
agittal, coronal, and 3 plane MIP reconstructions performed by the technologist. NASCET type criteria
 used evaluate stenoses. Automated exposure control utilized for radiation reduction purposes. 



COMPARISON: 

None available.



FINDINGS:



INTERNAL CAROTID ARTERIES: There is notable extensive atherosclerotic calcification involving intracr
anial ICAs with moderate to marked segmental stenosis on the right. There is moderate stenosis involv
ing intracranial left ICA involving the cavernous segment.



VERTEBROBASILAR SYSTEM: There is notable irregularity of the vertebral basilar system with moderate n
arrowing of the distal basilar artery also indicative of atherosclerotic disease. There is mild narro
wing of the intracranial vertebral arteries and more proximal basilar artery.



CEREBRAL ARTERIES: There is occlusion of the P2 segment of the right PCA with old infarct and encepha
lomalacia within the right occipital lobe at. There is notable irregularity of the left PCA with mode
rate segmental narrowing again indicative of atherosclerotic disease.



There is notable irregularity of the proximal anterior circulation vessels, particularly the middle c
erebral artery branches again compatible with diffuse atherosclerotic disease. There is mild to moder
ate segmental narrowing without clear CT evidence of large vessel occlusion involving proximal segmen
ts.



ANEURYSM: There is a sacculation directed superiorly near the origin of the left callosomarginal abbe
ry measuring 2 to 3 mm sagittally compatible with small aneurysm. There is no further clear CTA evide
nce of intracranial aneurysm.



ADDITIONAL FINDINGS: There is extensive heterogeneous opacification of the left maxillary sinus exten
ding to the left nasal cavity. 



IMPRESSION:

There is extensive irregularity involving intracranial vessels most consistent with diffuse atheroscl
erotic disease as detailed above.



The findings also include notable atherosclerotic calcification involving intracranial ICAs with mode
rate to marked segmental stenosis, greater on the right.



There is occlusion of the P2 segment of the right PCA with old right PCA infarct and encephalomalacia
 involving occipital lobe as described.



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
ve.



Signer Name: Mikey Patricio MD 

Signed: 5/28/2022 3:43 PM

Workstation Name: [id number]

## 2022-05-28 NOTE — CONSULTATION
History of Present Illness


Consult date: 05/28/22


Reason for Consult: Increase weakness and confusion,Hx of CVA,dementia,UTI


History of present illness: 





She is confused and weak today





History of present illness: 


77 YO Female with CVA with RHP on Antiplatelet therapy, HTN, HLD, Gout, Vascular

Dementia, Cerebral Atherosclerosis, DM presents ED for evaluation.  Patient has 

diminished cognition and is unable to provide detailed history.  Patient history

provided by patient caregivers who are at bedside during exam and interview.  As

per caregiver the patient was found to have increased weakness and confusion 

today.  Caregivers reported that they suspected the patient may have had another

stroke.  Patient transported to [facility] via private vehicle for the care and ev

aluation of the aforementioned symptoms.  The patient was seen and evaluated in 

the emergency department.  All lab and imaging studies reviewed.  Patient found 

to have urinary tract infection complicated by systemic inflammatory response 

syndrome, metabolic encephalopathy, as well as volume depletion.  Patient 

initiated on CVA protocol without evidence of new CVA.  Teleneurology consulted.

 No reports of fever, chills, chest pain, palpitation, productive cough, skin 

rash, recent contact, known exposure to COVID-19.  No prior admission for 

review.  All medication listed at time of admission as reconciled.  Advanced 

care planning conducted in ED.  Patient has diminished cognition but has a pos

itive gag reflex and is able to protect her airway without difficulty at the 

time my evaluation.





Today she is alert interactive,follow simple command she is hard hearing , she 

is with no complaint ,disoriented to date and place, thinks she is 37 ys old


Ct brain is with significant white matter changes and subcortical and right 

parieto-occipital infarct .








Past History


Past Medical History: diabetes, hypertension, hyperlipidemia, stroke


Past Surgical History: hysterectomy


Social history: .  denies: smoking, alcohol abuse, prescription drug 

abuse


Family history: diabetes, hypertension





Medications and Allergies


                                    Allergies











Allergy/AdvReac Type Severity Reaction Status Date / Time


 


No Known Allergies Allergy   Verified 07/07/15 10:51











                                Home Medications











 Medication  Instructions  Recorded  Confirmed  Last Taken  Type


 


Aspirin [Aspirin BABY CHEW TAB] 81 mg DAILY 01/25/21 01/25/21 01/24/21 History


 


Metformin HCl [metFORMIN  mg PO BID 01/25/21 01/25/21 01/24/21 History





Osmotic]     


 


lisinopriL [Zestril TAB] 20 mg PO QDAY 01/25/21 01/25/21 01/24/21 History


 


ALBUTEROL NEB's [Proventil 0.083% 2.5 mg IH Q3HRT PRN  nebu 01/28/21  Unknown Rx





NEBS]     


 


Amlodipine Besylate [Norvasc] 10 mg PO DAILY #30 tab 01/28/21  Unknown Rx


 


AtorvaSTATin 10 mg PO QHS #30 tab 01/28/21 01/25/21 01/24/21 Rx


 


Insulin Glargine [Lantus VIAL] 30 units SUB-Q QHS #15 ml 01/28/21  Unknown Rx


 


Lispro Insulin [HumaLOG] 12 unit SUB-Q AC #15 ml 01/28/21  Unknown Rx


 


[facility]/28/21  Unknown 

Rx





[Ultra Comfort]     


 


allopurinoL [Zyloprim] 300 mg PO QDAY #30 tab 01/28/21 01/25/21 01/24/21 Rx


 


Insulin Glargine [Lantus VIAL] 30 units SUB-Q QHS #15 ml 02/01/21  Unknown Rx


 


Lispro Insulin [HumaLOG] 12 unit SQ AC #15 ml 02/01/21  Unknown Rx


 


Syringe & Needle,Insulin,[facility] 02/01/21  Unknown 

Rx





[Ultra Comfort]     











Active Meds: 


Active Medications





Sodium Chloride (Nacl 0.9% 1000 Ml)  1,000 mls @ 999 mls/hr IV BOLUS ONE


   Stop: 05/26/22 21:26


Ceftriaxone Sodium (Rocephin/Ns 1 Gm/50 Ml)  1 gm in 50 mls @ 100 mls/hr IV ONCE

ONE


   Stop: 05/26/22 21:29











Review of Systems


ROS unobtainable: due to mental status











Past History


Past Medical History: diabetes, hypertension, hyperlipidemia, stroke


Past Surgical History: hysterectomy


Social history: .  denies: smoking, alcohol abuse, prescription drug 

abuse


Family history: diabetes, hypertension





Medications and Allergies


                                    Allergies











Allergy/AdvReac Type Severity Reaction Status Date / Time


 


No Known Allergies Allergy   Verified 07/07/15 10:51











                                Home Medications











 Medication  Instructions  Recorded  Confirmed  Last Taken  Type


 


Aspirin [Aspirin BABY CHEW TAB] 81 mg DAILY 01/25/21 01/25/21 01/24/21 History


 


Metformin HCl [metFORMIN  mg PO BID 01/25/21 01/25/21 01/24/21 History





Osmotic]     


 


lisinopriL [Zestril TAB] 20 mg PO QDAY 01/25/21 01/25/21 01/24/21 History


 


ALBUTEROL NEB's [Proventil 0.083% 2.5 mg IH Q3HRT PRN  nebu 01/28/21  Unknown Rx





NEBS]     


 


Amlodipine Besylate [Norvasc] 10 mg PO DAILY #30 tab 01/28/21  Unknown Rx


 


AtorvaSTATin 10 mg PO QHS #30 tab 01/28/21 01/25/21 01/24/21 Rx


 


Insulin Glargine [Lantus VIAL] 30 units SUB-Q QHS #15 ml 01/28/21  Unknown Rx


 


Lispro Insulin [HumaLOG] 12 unit SUB-Q AC #15 ml 01/28/21  Unknown Rx


 


Syringe & Needle,Insulin,1 ml 1 each  [facility] #100 disp.syrin 01/28/21  Unknown 

Rx





[Ultra Comfort]     


 


allopurinoL [Zyloprim] 300 mg PO QDAY #30 tab 01/28/21 01/25/21 01/24/21 Rx


 


Insulin Glargine [Lantus VIAL] 30 units SUB-Q QHS #15 ml 02/01/21  Unknown Rx


 


Lispro Insulin [HumaLOG] 12 unit SQ AC #15 ml 02/01/21  Unknown Rx


 


Syringe & Needle,Insulin,1 ml 1 each  [facility] #100 disp.syrin 02/01/21  Unknown 

Rx





[Ultra Comfort]     











Active Meds: 


Active Medications





Acetaminophen (Acetaminophen 325 Mg Tab)  650 mg PO Q4H PRN


   PRN Reason: Pain MILD(1-3)/Fever >100.5/HA


   Last Admin: 05/26/22 21:37 Dose:  650 mg


   


Albuterol (Albuterol 2.5 Mg/3 Ml Nebu)  2.5 mg IH Q4HRT PRN


   PRN Reason: Shortness Of Breath


Allopurinol (Allopurinol 300 Mg Tab)  300 mg PO QDAY [facility]


   Last Admin: 05/28/22 10:08 Dose:  300 mg


   


Amlodipine Besylate (Amlodipine 10 Mg Tab)  10 mg PO DAILY [facility]


   Last Admin: 05/28/22 10:03 Dose:  10 mg


   


Aspirin (Aspirin Ec 325 Mg Tab)  325 mg PO QDAY [facility]


   Last Admin: 05/28/22 10:03 Dose:  325 mg


   


Atorvastatin Calcium (Atorvastatin 40 Mg Tab)  40 mg PO QHS [facility]


Dextrose (Dextrose 50% In Water (25gm) 50 Ml Syringe)  50 ml IV Q30MIN PRN; 

Protocol


   PRN Reason: Hypoglycemia


Hydromorphone HCl (Hydromorphone 0.5 Mg/0.5 Ml Inj)  0.5 mg IV Q23H PRN


   PRN Reason: Pain , Severe (7-10)


Sodium Chloride (Nacl 0.9% 1000 Ml)  1,000 mls @ 75 mls/hr IV AS DIRECT [facility]


   Last Admin: 05/27/22 23:24 Dose:  75 mls/hr


   


Levofloxacin/Dextrose (Levaquin 500mg/100ml)  500 mg in 100 mls @ 100 mls/hr IV 

Q24H [facility]; Protocol


   Last Admin: 05/27/22 21:52 Dose:  100 mls/hr


   


Insulin Human Lispro (Insulin Lispro 100 Unit/Ml)  0 unit SUB-Q Q6HR [facility]; 

Protocol


   Last Admin: 05/28/22 06:03 Dose:  2 unit


   


Lisinopril (Lisinopril 20 Mg Tab)  20 mg PO QDAY [facility]


   Last Admin: 05/28/22 10:04 Dose:  20 mg


   


Ondansetron HCl (Ondansetron 4 Mg/2 Ml Inj)  4 mg IV Q8H PRN


   PRN Reason: Nausea And Vomiting


   Last Admin: 05/26/22 21:35 Dose:  4 mg


   


Oxycodone/Acetaminophen (Oxycodone /Acetaminophen 5-325mg Tab)  1 tab PO Q16H [place]

N


   PRN Reason: Pain, Moderate (4-6)


Sodium Chloride (Sodium Chloride 0.9% 10 Ml Flush Syringe)  10 ml IV BID [facility]


   Last Admin: 05/28/22 10:03 Dose:  10 ml


   


Sodium Chloride (Sodium Chloride 0.9% 10 Ml Flush Syringe)  10 ml IV PRN PRN


   PRN Reason: LINE FLUSH











Physical Examination





- Vital Signs


Vital Signs: 


                                   Vital Signs











Temp Pulse Resp BP Pulse Ox


 


 97 F L  97 H  18   186/89   96 


 


 05/26/22 19:03  05/26/22 19:03  05/26/22 19:03  05/26/22 19:03  05/26/22 19:03














- Constitutional


General appearance: comfortable





- EENT


EENT: Present: PERRL, mucous membranes moist





- Respiratory


Respiratory: Present: lungs clear, rhonchi





- Cardiovascular


Cardiovascular: Present: regular rate, normal S1, normal S2


Extremities: Present: no peripheral edema bilatateraly, no clubbing, cyanosis





- Gastrointestinal


Gastrointestinal: Present: normoactive bowel sounds





- Integumentary


Integumentary: Present: normal





- Neurologic


Cranial nerve examination: PERRL, EOMI, intact


Speech examination: intact


Sensorimotor examination: intact


Detailed motor examination: other (slight right pronator drift, planter is down 

going, gait is not done.)





- Level of Consciousness


1a. Level of Consciousness: alert/keenly responsive





- LOC Questions


1b. LOC Questions: answers no questions correctly





- LOC Command


1c. LOC Commands: performs tasks correctly





- Best Gaze


2. Best Gaze: normal





- Visual


3. Visual: no visual loss





- Facial Palsy


4. Facial Palsy: normal symmetrical movement





- Motor Arm


5a. Motor Arm Left: no drift


5b. Motor Arm Right: drift





- Motor Leg


6a. Motor Leg Left: no drift


6b. Motor Leg Right: no drift





- Limb Ataxia


7. Limb Ataxia: absent





- Sensory


8. Sensory: normal





- Best Language


9. Best Language: no aphasia





- Dysarthria


10. Dysarthria: normal





- Extinction and Inattention


11. Extinction/Inattention: no abnormality





- Scoring


Total Score: 3


Stroke Severity: Minor Stroke





Results





- Laboratory Findings


CBC and BMP: 


                                 05/27/22 05:23





                                 05/27/22 05:23


Abnormal Lab Findings: 


                                  Abnormal Labs











  05/26/22 05/26/22 05/26/22





  19:41 19:41 19:41


 


WBC  12.7 H  


 


RBC  5.24 H  


 


Hct  43.3 H  


 


MCH  26 L  


 


Mono # (Auto)  0.9 H  


 


Seg Neutrophils %   


 


Seg Neutrophils #  8.7 H  


 


Thrombin Time   21.1 H 


 


Chloride    110.2 H


 


BUN    32 H


 


Glucose    134 H


 


POC Glucose   














  05/27/22 05/27/22 05/27/22





  01:49 05:23 05:23


 


WBC   


 


RBC   


 


Hct   


 


MCH   27 L 


 


[place] # (Auto)   


 


Seg Neutrophils %   70.5 H 


 


Seg Neutrophils #   


 


Thrombin Time   


 


Chloride    112.8 H


 


BUN    29 H


 


Glucose    135 H


 


POC Glucose  113 H  














  05/27/22 05/27/22 05/27/22





  05:50 12:13 23:45


 


WBC   


 


RBC   


 


Hct   


 


MCH   


 


Mono # (Auto)   


 


Seg Neutrophils %   


 


Seg Neutrophils #   


 


Thrombin Time   


 


Chloride   


 


BUN   


 


Glucose   


 


POC Glucose  128 H  159 H  154 H














  05/28/22





  05:54


 


WBC 


 


RBC 


 


Hct 


 


MCH 


 


Mono # (Auto) 


 


Seg Neutrophils % 


 


Seg Neutrophils # 


 


Thrombin Time 


 


Chloride 


 


BUN 


 


Glucose 


 


POC Glucose  167 H














Assessment and Plan





Assessment and Plan


77 YO Female with CVA with RHP on Antiplatelet therapy, HTN, HLD, Gout, Vascular

Dementia, Cerebral Atherosclerosis, DM presents ED for evaluation of diminished 

cognition and was unable to provide detailed history.  Patient's history 

provided by patient caregivers who are at bedside during exam and interview.  As

per caregiver the patient was found to have increased weakness and confusion 

today.  Caregivers reported that they suspected the patient may have had another

stroke.  Patient transported to [facility] via private vehicle for the care and 

evaluation of the aforementioned symptoms.  The patient was seen and evaluated 

in the emergency department.  The patient was admitted with diagnosis below:





- Patient Problems


# Encephalopathy


-R/O infectious,Toxic metabolic etiology


-Ct head is with multiinfarct 


-MRI is noted


-Check B12 and TSH


-US 





# Vascular dementia


-She is hard hearing


-Verbal prompting, verbal redirection, benzodiazepine therapy as clinically 

indicated.








# Right side weakness 


-CT showed multiinfarct


_MRI brain left thalamic infarct


-Echo is with EF#55-60%LVH


-LDL is pending


-A1C is pending


-Suggest CTA brain and neck


-Cardiac monitoring


-PT/ST evaluate


- mg daily


-Lipitor 40 mg





# Systemic inflammatory response syndrome


-CBC, CMP, urinalysis, chest x-ray, IV fluid resuscitation therapy, IV antib

iotic therapy, supportive care.








# UTI (urinary tract infection)


-CBC, urinalysis, IV antibiotic therapy, repeat CBC in a.m.





# Volume depletion


-IV fluid resuscitation therapy, monitor urine output every shift, monitor fluid

balance.





# DVT prophylaxis


-SCD to bilateral lower extremities while in bed








PLAN


1- Maintain ASA and Lipitor


2- CTA brain and neck


3- LDL and A1C


4- PT/St evaluate


5- TSH,B12





will follow

## 2022-05-29 RX ADMIN — Medication SCH ML: at 11:45

## 2022-05-29 RX ADMIN — INSULIN LISPRO SCH: 100 INJECTION, SOLUTION INTRAVENOUS; SUBCUTANEOUS at 14:21

## 2022-05-29 RX ADMIN — SODIUM CHLORIDE SCH MLS/HR: 0.9 INJECTION, SOLUTION INTRAVENOUS at 05:19

## 2022-05-29 RX ADMIN — INSULIN LISPRO SCH: 100 INJECTION, SOLUTION INTRAVENOUS; SUBCUTANEOUS at 00:27

## 2022-05-29 RX ADMIN — LISINOPRIL SCH MG: 20 TABLET ORAL at 11:43

## 2022-05-29 RX ADMIN — ASPIRIN SCH MG: 325 TABLET, COATED ORAL at 11:44

## 2022-05-29 RX ADMIN — INSULIN LISPRO SCH UNIT: 100 INJECTION, SOLUTION INTRAVENOUS; SUBCUTANEOUS at 11:48

## 2022-05-29 RX ADMIN — SODIUM CHLORIDE SCH MLS/HR: 0.9 INJECTION, SOLUTION INTRAVENOUS at 22:31

## 2022-05-29 RX ADMIN — INSULIN LISPRO SCH: 100 INJECTION, SOLUTION INTRAVENOUS; SUBCUTANEOUS at 18:01

## 2022-05-29 RX ADMIN — Medication SCH ML: at 22:31

## 2022-05-29 NOTE — PROGRESS NOTE
Assessment and Plan


Assessment and plan: 





77 YO Female with CVA with RHP on Antiplatelet therapy, HTN, HLD, Gout, Vascular

Dementia, Cerebral Atherosclerosis, DM presents ED for evaluation of diminished 

cognition and was unable to provide detailed history.  Patient's history 

provided by patient caregivers who are at bedside during exam and interview.  As

per caregiver the patient was found to have increased weakness and confusion 

today.  Caregivers reported that they suspected the patient may have had another

stroke.  Patient transported to [facility] via private vehicle for the care and 

evaluation of the aforementioned symptoms.  The patient was seen and evaluated 

in the emergency department.  The patient was admitted with diagnosis below:





Acute left thalamic CVA.  





SIRS.  Patient meets criteria given the tachycardia, leukocytosis and altered 

mentation.  No signs of infection





Diabetes mellitus type 2





Hypertension.





Metabolic encephalopathy





Vascular dementia





Cerebral atherosclerosis





5/27/2022.  Urinalysis does not reveal any signs of infection.  We will follow-

up blood cultures for further evaluation.  Continue empiric antibiotics for now.

 We will proceed with further follow-up and rule out CVA.  Check MRI of brain.  

Patient appears to be back to her baseline mental status.  ?  TIA.





5/28/2022.  MRI reveals acute anterior left thalamic CVA measuring 1.4 cm.  We 

will initiate aspirin and Lipitor for secondary prevention.  Await 

echocardiogram results to rule out thromboembolic source.  Neurology has been 

consulted.  Await physical therapy evaluation.  Patient's BG has been stable not

requiring home Lantus insulin.  We will continue sliding scale regular insulin 

and monitor closely





5/29/2022.  Neurology evaluated the patient and recommends CTA of the head and 

neck.  Await PT evaluation for disposition.  Continue aspirin and Lipitor.





History


Interval history: 





No new issues overnight.





Hospitalist Physical





- Constitutional


Vitals: 


                                        











Temp Pulse Resp BP Pulse Ox


 


 98.4 F   76   18   137/65   95 


 


 05/29/22 04:53  05/29/22 04:53  05/29/22 04:53  05/29/22 04:53  05/29/22 04:53











General appearance: Present: no acute distress





- EENT


Eyes: Present: PERRL, EOM intact


ENT: hearing intact, clear oral mucosa, dentition normal





- Neck


Neck: Present: supple, normal ROM





- Respiratory


Respiratory effort: normal


Respiratory: bilateral: CTA





- Cardiovascular


Rhythm: regular


Heart Sounds: Present: S1 & S2.  Absent: gallop, rub





- Extremities


Extremities: no ischemia, No edema, Full ROM





- Abdominal


General gastrointestinal: soft, non-tender, non-distended, normal bowel sounds





- Integumentary


Integumentary: Present: clear, warm, dry





- Neurologic


Neurologic: CNII-XII intact, moves all extremities





HEART Score





- HEART Score


Troponin: 


                                        











Troponin T  < 0.010 ng/mL (0.00-0.029)   05/26/22  19:41    














Results





- Labs


CBC & Chem 7: 


                                 05/27/22 05:23





                                 05/27/22 05:23


Labs: 


                             Laboratory Last Values











WBC  10.4 K/mm3 (4.5-11.0)   05/27/22  05:23    


 


RBC  4.95 M/mm3 (3.65-5.03)   05/27/22  05:23    


 


Hgb  13.1 gm/dl (10.1-14.3)   05/27/22  05:23    


 


Hct  41.0 % (30.3-42.9)   05/27/22  05:23    


 


MCV  83 fl (79-97)   05/27/22  05:23    


 


MCH  27 pg (28-32)  L  05/27/22  05:23    


 


MCHC  32 % (30-34)   05/27/22  05:23    


 


RDW  14.2 % (13.2-15.2)   05/27/22  05:23    


 


Plt Count  247 K/mm3 (140-440)   05/27/22  05:23    


 


Lymph % (Auto)  18.9 % (13.4-35.0)   05/27/22  05:23    


 


Mono % (Auto)  7.2 % (0.0-7.3)   05/27/22  05:23    


 


Eos % (Auto)  2.9 % (0.0-4.3)   05/27/22  05:23    


 


Baso % (Auto)  0.5 % (0.0-1.8)   05/27/22  05:23    


 


Lymph # (Auto)  2.0 K/mm3 (1.2-5.4)   05/27/22  05:23    


 


Mono # (Auto)  0.8 K/mm3 (0.0-0.8)   05/27/22  05:23    


 


Eos # (Auto)  0.3 K/mm3 (0.0-0.4)   05/27/22  05:23    


 


Baso # (Auto)  0.0 K/mm3 (0.0-0.1)   05/27/22  05:23    


 


Seg Neutrophils %  70.5 % (40.0-70.0)  H  05/27/22  05:23    


 


Seg Neutrophils #  7.3 K/mm3 (1.8-7.7)   05/27/22  05:23    


 


PT  13.8 Sec. (12.2-14.9)   05/26/22  19:41    


 


INR  0.96  (0.87-1.13)   05/26/22  19:41    


 


APTT  31.5 Sec. (24.2-36.6)   05/26/22  19:41    


 


Thrombin Time  21.1 Sec. (15.1-19.6)  H  05/26/22  19:41    


 


Sodium  144 mmol/L (137-145)   05/27/22  05:23    


 


Potassium  4.7 mmol/L (3.6-5.0)   05/27/22  05:23    


 


Chloride  112.8 mmol/L ()  H  05/27/22  05:23    


 


Carbon Dioxide  22 mmol/L (22-30)   05/27/22  05:23    


 


Anion Gap  14 mmol/L  05/27/22  05:23    


 


BUN  29 mg/dL (7-17)  H  05/27/22  05:23    


 


Creatinine  1.0 mg/dL (0.6-1.2)   05/27/22  05:23    


 


Estimated GFR  > 60 ml/min  05/27/22  05:23    


 


BUN/Creatinine Ratio  29 %  05/27/22  05:23    


 


Glucose  135 mg/dL ()  H  05/27/22  05:23    


 


POC Glucose  183 mg/dL ()  H  05/29/22  05:23    


 


Hemoglobin A1c  7.5 % (4-6)  H  05/28/22  12:30    


 


Calcium  8.5 mg/dL (8.4-10.2)   05/27/22  05:23    


 


Total Bilirubin  0.30 mg/dL (0.1-1.2)   05/26/22  19:41    


 


AST  15 units/L (5-40)   05/26/22  19:41    


 


ALT  15 units/L (7-56)   05/26/22  19:41    


 


Alkaline Phosphatase  94 units/L ()   05/26/22  19:41    


 


Troponin T  < 0.010 ng/mL (0.00-0.029)   05/26/22  19:41    


 


Total Protein  7.0 g/dL (6.3-8.2)   05/26/22  19:41    


 


Albumin  3.9 g/dL (3.9-5)   05/26/22  19:41    


 


Albumin/Globulin Ratio  1.3 %  05/26/22  19:41    


 


Triglycerides  151 mg/dL (2-149)  H  05/28/22  12:30    


 


Cholesterol  114 mg/dL ()   05/28/22  12:30    


 


LDL Cholesterol Direct  67 mg/dL ()   05/28/22  12:30    


 


HDL Cholesterol  31 mg/dL (40-59)  L  05/28/22  12:30    


 


Cholesterol/HDL Ratio  3.67 %  05/28/22  12:30    


 


Vitamin B12  373.7 pg/mL (211-911)   05/28/22  12:30    


 


TSH  1.130 mlU/mL (0.270-4.200)   05/28/22  12:30    


 


Urine Color  Straw  (Yellow)   05/27/22  09:00    


 


Urine Turbidity  Clear  (Clear)   05/27/22  09:00    


 


Urine pH  5.0  (5.0-7.0)   05/27/22  09:00    


 


Ur Specific Gravity  1.012  (1.003-1.030)   05/27/22  09:00    


 


Urine Protein  <15 mg/dl mg/dL (Negative)   05/27/22  09:00    


 


Urine Glucose (UA)  >=500 mg/dL (Negative)   05/27/22  09:00    


 


Urine Ketones  Neg mg/dL (Negative)   05/27/22  09:00    


 


Urine Blood  Neg  (Negative)   05/27/22  09:00    


 


Urine Nitrite  Neg  (Negative)   05/27/22  09:00    


 


Urine Bilirubin  Neg  (Negative)   05/27/22  09:00    


 


Urine Urobilinogen  < 2.0 mg/dL (<2.0)   05/27/22  09:00    


 


Ur Leukocyte Esterase  Neg  (Negative)   05/27/22  09:00    


 


Urine WBC (Auto)  < 1.0 /HPF (0.0-6.0)   05/27/22  09:00    


 


Urine RBC (Auto)  < 1.0 /HPF (0.0-6.0)   05/27/22  09:00    


 


U Epithel Cells (Auto)  3.0 /HPF (0-13.0)   05/27/22  09:00    











Microbiology: 


Microbiology





05/26/22 20:47   Peripheral/Venous   Blood Culture - Preliminary


                            NO GROWTH AFTER 48 HOURS


05/26/22 20:47   Peripheral/Venous   Blood Culture - Preliminary


                            NO GROWTH AFTER 48 HOURS








[place]/IV: 


                                        





Voiding Method                   Toilet











Active Medications





- Current Medications


Current Medications: 














Generic Name Dose Route Start Last Admin





  Trade Name Freq  PRN Reason Stop Dose Admin


 


Acetaminophen  650 mg  05/26/22 20:55  05/26/22 21:37





  Acetaminophen 325 Mg Tab  PO   650 mg





  Q4H PRN   Administration





  Pain MILD(1-3)/Fever >100.5/HA  


 


Albuterol  2.5 mg  05/26/22 20:55 





  Albuterol 2.5 Mg/3 Ml Nebu  IH  





  Q4HRT PRN  





  Shortness Of Breath  


 


Allopurinol  300 mg  05/27/22 10:00  05/28/22 10:08





  Allopurinol 300 Mg Tab  PO   300 mg





  QDAY KARLEI   Administration


 


Amlodipine Besylate  10 mg  05/27/22 10:00  05/28/22 10:03





  Amlodipine 10 Mg Tab  PO   10 mg





  DAILY KARLIE   Administration


 


Aspirin  325 mg  05/28/22 10:00  05/28/22 10:03





  Aspirin Ec 325 Mg Tab  PO   325 mg





  QDAY KARLIE   Administration


 


Atorvastatin Calcium  40 mg  05/28/22 22:00  05/28/22 21:03





  Atorvastatin 40 Mg Tab  PO   40 mg





  QHS KARLIE   Administration


 


Dextrose  50 ml  05/26/22 20:58 





  Dextrose 50% In Water (25gm) 50 Ml Syringe  IV  





  Q30MIN PRN  





  Hypoglycemia  





  Protocol  


 


Hydromorphone HCl  0.5 mg  05/26/22 20:55 





  Hydromorphone 0.5 Mg/0.5 Ml Inj  IV  





  Q23H PRN  





  Pain , Severe (7-10)  


 


Sodium Chloride  1,000 mls @ 75 mls/hr  05/26/22 21:00  05/29/22 05:19





  Nacl 0.9% 1000 Ml  IV   75 mls/hr





  AS DIRECT KARLIE   Administration


 


Levofloxacin/Dextrose  500 mg in 100 mls @ 100 mls/hr  05/26/22 21:00  05/28/22 

20:36





  Levaquin 500mg/100ml  IV  05/30/22 23:59  100 mls/hr





  Q24H KARLIE   Administration





  Protocol  


 


Insulin Human Lispro  0 unit  05/27/22 00:00  05/29/22 00:27





  Insulin Lispro 100 Unit/Ml  SUB-Q   Not Given





  Q6HR [facility]  





  Protocol  


 


Lisinopril  20 mg  05/27/22 10:00  05/28/22 10:04





  Lisinopril 20 Mg Tab  PO   20 mg





  QDAY KARLIE   Administration


 


Ondansetron HCl  4 mg  05/26/22 20:55  05/26/22 21:35





  Ondansetron 4 Mg/2 Ml Inj  IV   4 mg





  Q8H PRN   Administration





  Nausea And Vomiting  


 


Oxycodone/Acetaminophen  1 tab  05/26/22 20:55 





  Oxycodone /Acetaminophen 5-325mg Tab  PO  





  Q16H PRN  





  Pain, Moderate (4-6)  


 


Sodium Chloride  10 ml  05/26/22 22:00  05/28/22 21:04





  Sodium Chloride 0.9% 10 Ml Flush Syringe  IV   10 ml





  BID KARLIE   Administration


 


Sodium Chloride  10 ml  05/26/22 20:55 





  Sodium Chloride 0.9% 10 Ml Flush Syringe  IV  





  PRN PRN  





  LINE FLUSH

## 2022-05-29 NOTE — PROGRESS NOTE
Assessment and Plan





Assessment and Plan


75 YO Female with CVA with RHP on Antiplatelet therapy, HTN, HLD, Gout, Vascular

Dementia, Cerebral Atherosclerosis, DM presents ED for evaluation of diminished 

cognition and was unable to provide detailed history.  Patient's history 

provided by patient caregivers who are at bedside during exam and interview.  As

per caregiver the patient was found to have increased weakness and confusion t

gerard.  Caregivers reported that they suspected the patient may have had another 

stroke.  Patient transported to [facility] via private vehicle for the care and 

evaluation of the aforementioned symptoms.  The patient was seen and evaluated 

in the emergency department.  The patient was admitted with diagnosis below:





- Patient Problems


# Encephalopathy


-R/O infectious,Toxic metabolic etiology


-Ct head is with multiinfarct 


-MRI is noted with new left thalamic infarct


-Check B12 and TSH--WNL





# Vascular dementia


-She is hard hearing


-Verbal prompting, verbal redirection, benzodiazepine therapy as clinically 

indicated.








# Right side weakness /improved


-CT showed multi-infarct


_MRI brain left thalamic infarct


-Echo is with EF#55-60%LVH


-LDL #67


-A1C #7.5


-CTA brain and neck--significant intra cranial atherosclerotic changes with 

Bilateral ICA stenosis,and occluded right PCA


-Cardiac monitoring


-PT/ST evaluate


- mg daily


-Lipitor 40 mg





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


-consider NS to see out pt. 








# Systemic inflammatory response syndrome


-CBC, CMP, urinalysis, chest x-ray, IV fluid resuscitation therapy, IV 

antibiotic therapy, supportive care.








# UTI (urinary tract infection)


-CBC, urinalysis, IV antibiotic therapy, repeat CBC in a.m.





# Volume depletion


-IV fluid resuscitation therapy, monitor urine output every shift, monitor fluid

balance.





# DVT prophylaxis


-SCD to bilateral lower extremities while in bed








PLAN


1- Maintain [id number] mg daily and Lipitor 40 mg dailt


2- PT/St evaluate


3- NS follow up


4- better control BP and Suger 





will follow as needed








Subjective


Date of service: 05/29/22


Interval history: 





doing well no complaint awake wants to go home


disoriented to place and date , know partially her birthday, no aphasia


CTA brain and neck is noted


MRI noted 


no weakness is appreciated today


Echo is with mild LVH 55-60% EF


LDL#67


-A1C#7.5


B12 and TSH wnl.





Objective





- Vital Sign


                               Vital Signs - 12hr











  05/29/22





  04:53


 


Temperature 98.4 F


 


Pulse Rate 76


 


Respiratory 18





Rate 


 


Blood Pressure 137/65


 


O2 Sat by Pulse 95





Oximetry 














- General Apperance


Constitutional: comfortable





- EENT


EENT: PERRL, mucous membranes moist





- Respiratory


Respiratory: lungs clear, rhonchi





- Cardiovascular


Cardiovascular: regular rate, normal S1, normal S2


Extremities: no peripheral edema bilat





- Gastrointestinal


Gastrointestinal: normoactive bowel sounds





- Integumentary


Integumentary: normal





- Neurologic


Cranial nerve examination: PERRL, EOMI, intact


Speech examination: intact


Detailed motor examination: grossly full strength in





- Laboratory Findings


CBC and BMP: 


                                 05/27/22 05:23





                                 05/27/22 05:23


Abnormal Lab Findings: 


                                  Abnormal Labs











  05/26/22 05/26/22 05/26/22





  19:41 19:41 19:41


 


WBC  12.7 H  


 


RBC  5.24 H  


 


Hct  43.3 H  


 


MCH  26 L  


 


Mono # (Auto)  0.9 H  


 


Seg Neutrophils %   


 


Seg Neutrophils #  8.7 H  


 


Thrombin Time   21.1 H 


 


Chloride    110.2 H


 


BUN    32 H


 


Glucose    134 H


 


POC Glucose   


 


Hemoglobin A1c   


 


Triglycerides   


 


HDL Cholesterol   














  05/27/22 05/27/22 05/27/22





  01:49 05:23 05:23


 


WBC   


 


RBC   


 


Hct   


 


MCH   27 L 


 


Ware # (Auto)   


 


Seg Neutrophils %   70.5 H 


 


Seg Neutrophils #   


 


Thrombin Time   


 


Chloride    112.8 H


 


BUN    29 H


 


Glucose    135 H


 


POC Glucose  113 H  


 


Hemoglobin A1c   


 


Triglycerides   


 


HDL Cholesterol   














  05/27/22 05/27/22 05/27/22





  05:50 12:13 23:45


 


WBC   


 


RBC   


 


Hct   


 


MCH   


 


Mono # (Auto)   


 


Seg Neutrophils %   


 


Seg Neutrophils #   


 


Thrombin Time   


 


Chloride   


 


BUN   


 


Glucose   


 


POC Glucose  128 H  159 H  154 H


 


Hemoglobin A1c   


 


Triglycerides   


 


HDL Cholesterol   














  05/28/22 05/28/22 05/28/22





  05:54 11:44 12:30


 


WBC   


 


RBC   


 


Hct   


 


MCH   


 


Mono # (Auto)   


 


Seg Neutrophils %   


 


Seg Neutrophils #   


 


Thrombin Time   


 


Chloride   


 


BUN   


 


Glucose   


 


POC Glucose  167 H  165 H 


 


Hemoglobin A1c    7.5 H


 


Triglycerides   


 


HDL Cholesterol   














  05/28/22 05/28/22 05/28/22





  12:30 18:12 23:20


 


WBC   


 


RBC   


 


Hct   


 


MCH   


 


Mono # (Auto)   


 


Seg Neutrophils %   


 


Seg Neutrophils #   


 


Thrombin Time   


 


Chloride   


 


BUN   


 


Glucose   


 


POC Glucose   218 H  134 H


 


Hemoglobin A1c   


 


Triglycerides  151 H  


 


HDL Cholesterol  31 L  














  05/29/22





  05:23


 


WBC 


 


RBC 


 


Hct 


 


MCH 


 


Mono # (Auto) 


 


Seg Neutrophils % 


 


Seg Neutrophils # 


 


Thrombin Time 


 


Chloride 


 


BUN 


 


Glucose 


 


POC Glucose  183 H


 


Hemoglobin A1c 


 


Triglycerides 


 


HDL Cholesterol

## 2022-05-30 RX ADMIN — ASPIRIN SCH MG: 325 TABLET, COATED ORAL at 09:08

## 2022-05-30 RX ADMIN — Medication SCH ML: at 09:09

## 2022-05-30 RX ADMIN — Medication SCH ML: at 21:37

## 2022-05-30 RX ADMIN — INSULIN LISPRO SCH UNIT: 100 INJECTION, SOLUTION INTRAVENOUS; SUBCUTANEOUS at 11:31

## 2022-05-30 RX ADMIN — INSULIN LISPRO SCH: 100 INJECTION, SOLUTION INTRAVENOUS; SUBCUTANEOUS at 22:00

## 2022-05-30 RX ADMIN — INSULIN LISPRO SCH UNIT: 100 INJECTION, SOLUTION INTRAVENOUS; SUBCUTANEOUS at 17:47

## 2022-05-30 RX ADMIN — SODIUM CHLORIDE SCH MLS/HR: 0.9 INJECTION, SOLUTION INTRAVENOUS at 11:33

## 2022-05-30 RX ADMIN — LISINOPRIL SCH MG: 20 TABLET ORAL at 09:08

## 2022-05-30 RX ADMIN — INSULIN LISPRO SCH: 100 INJECTION, SOLUTION INTRAVENOUS; SUBCUTANEOUS at 07:20

## 2022-05-30 RX ADMIN — INSULIN LISPRO SCH: 100 INJECTION, SOLUTION INTRAVENOUS; SUBCUTANEOUS at 00:00

## 2022-05-30 NOTE — PROGRESS NOTE
Assessment and Plan


Assessment and plan: 





77 YO Female with CVA with RHP on Antiplatelet therapy, HTN, HLD, Gout, Vascular

Dementia, Cerebral Atherosclerosis, DM presents ED for evaluation of diminished 

cognition and was unable to provide detailed history.  Patient's history 

provided by patient caregivers who are at bedside during exam and interview.  As

per caregiver the patient was found to have increased weakness and confusion 

today.  Caregivers reported that they suspected the patient may have had another

stroke.  Patient transported to [facility] via private vehicle for the care and 

evaluation of the aforementioned symptoms.  The patient was seen and evaluated 

in the emergency department.  The patient was admitted with diagnosis below:





Acute left thalamic CVA.  





SIRS.  Patient meets criteria given the tachycardia, leukocytosis and altered 

mentation.  No signs of infection





Diabetes mellitus type 2





Hypertension.





Metabolic encephalopathy





Vascular dementia





Cerebral atherosclerosis





5/27/2022.  Urinalysis does not reveal any signs of infection.  We will follow-

up blood cultures for further evaluation.  Continue empiric antibiotics for now.

 We will proceed with further follow-up and rule out CVA.  Check MRI of brain.  

Patient appears to be back to her baseline mental status.  ?  TIA.





5/28/2022.  MRI reveals acute anterior left thalamic CVA measuring 1.4 cm.  We 

will initiate aspirin and Lipitor for secondary prevention.  Await 

echocardiogram results to rule out thromboembolic source.  Neurology has been 

consulted.  Await physical therapy evaluation.  Patient's BG has been stable not

requiring home Lantus insulin.  We will continue sliding scale regular insulin 

and monitor closely





5/29/2022.  Neurology evaluated the patient and recommends CTA of the head and 

neck.  Await PT evaluation for disposition.  Continue aspirin and Lipitor.





5/30/2022.  CT of the head and neck reveals extensive irregularity involving int

racranial vessels most consistent with diffuse atherosclerotic disease.  

Findings also include calcification involving intracranial ICAs with moderate to

marked segmental stenosis greatest on the right.  There is occlusion of the P2 

segment of the right PCA with old right PCA infarct and encephalomalacia 

involving occipital lobe.  Patient also has atherosclerotic calcification 

involving proximal left ICA with 50% stenosis.  Mild plaque involving right 

carotid bifurcation with significant stenosis.  Neurology recommends 

neurosurgery consultation.  We will also consult vascular for further evalu

ation.  Continue aspirin and Lipitor.  Physical therapy recommends home health 

PT and rolling walker





History


Interval history: 





No new issues overnight.





Hospitalist Physical





- Constitutional


Vitals: 


                                        











Temp Pulse Resp BP Pulse Ox


 


 97.9 F   72   18   152/71   100 


 


 05/29/22 22:39  05/29/22 22:39  05/29/22 22:39  05/29/22 22:40  05/30/22 07:12











General appearance: Present: no acute distress





- EENT


Eyes: Present: PERRL, EOM intact


ENT: hearing intact, clear oral mucosa, dentition normal





- Neck


Neck: Present: supple, normal ROM





- Respiratory


Respiratory effort: normal


Respiratory: bilateral: CTA





- Cardiovascular


Rhythm: regular


Heart Sounds: Present: S1 & S2.  Absent: gallop, rub





- Extremities


Extremities: no ischemia, No edema, Full ROM





- Abdominal


General gastrointestinal: soft, non-tender, non-distended, normal bowel sounds





- Integumentary


Integumentary: Present: clear, warm, dry





- Neurologic


Neurologic: CNII-XII intact, moves all extremities





HEART Score





- HEART Score


Troponin: 


                                        











Troponin T  < 0.010 ng/mL (0.00-0.029)   05/26/22  19:41    














Results





- Labs


CBC & Chem 7: 


                                 05/27/22 05:23





                                 05/27/22 05:23


Labs: 


                             Laboratory Last Values











WBC  10.4 K/mm3 (4.5-11.0)   05/27/22  05:23    


 


RBC  4.95 M/mm3 (3.65-5.03)   05/27/22  05:23    


 


Hgb  13.1 gm/dl (10.1-14.3)   05/27/22  05:23    


 


Hct  41.0 % (30.3-42.9)   05/27/22  05:23    


 


MCV  83 fl (79-97)   05/27/22  05:23    


 


MCH  27 pg (28-32)  L  05/27/22  05:23    


 


MCHC  32 % (30-34)   05/27/22  05:23    


 


RDW  14.2 % (13.2-15.2)   05/27/22  05:23    


 


Plt Count  247 K/mm3 (140-440)   05/27/22  05:23    


 


Lymph % (Auto)  18.9 % (13.4-35.0)   05/27/22  05:23    


 


Mono % (Auto)  7.2 % (0.0-7.3)   05/27/22  05:23    


 


Eos % (Auto)  2.9 % (0.0-4.3)   05/27/22  05:23    


 


Baso % (Auto)  0.5 % (0.0-1.8)   05/27/22  05:23    


 


Lymph # (Auto)  2.0 K/mm3 (1.2-5.4)   05/27/22  05:23    


 


Mono # (Auto)  0.8 K/mm3 (0.0-0.8)   05/27/22  05:23    


 


Eos # (Auto)  0.3 K/mm3 (0.0-0.4)   05/27/22  05:23    


 


Baso # (Auto)  0.0 K/mm3 (0.0-0.1)   05/27/22  05:23    


 


Seg Neutrophils %  70.5 % (40.0-70.0)  H  05/27/22  05:23    


 


Seg Neutrophils #  7.3 K/mm3 (1.8-7.7)   05/27/22  05:23    


 


PT  13.8 Sec. (12.2-14.9)   05/26/22  19:41    


 


INR  0.96  (0.87-1.13)   05/26/22  19:41    


 


APTT  31.5 Sec. (24.2-36.6)   05/26/22  19:41    


 


Thrombin Time  21.1 Sec. (15.1-19.6)  H  05/26/22  19:41    


 


Sodium  144 mmol/L (137-145)   05/27/22  05:23    


 


Potassium  4.7 mmol/L (3.6-5.0)   05/27/22  05:23    


 


Chloride  112.8 mmol/L ()  H  05/27/22  05:23    


 


Carbon Dioxide  22 mmol/L (22-30)   05/27/22  05:23    


 


Anion Gap  14 mmol/L  05/27/22  05:23    


 


BUN  29 mg/dL (7-17)  H  05/27/22  05:23    


 


Creatinine  1.0 mg/dL (0.6-1.2)   05/27/22  05:23    


 


Estimated GFR  > 60 ml/min  05/27/22  05:23    


 


BUN/Creatinine Ratio  29 %  05/27/22  05:23    


 


Glucose  135 mg/dL ()  H  05/27/22  05:23    


 


POC Glucose  171 mg/dL ()  H  05/30/22  11:16    


 


Hemoglobin A1c  7.5 % (4-6)  H  05/28/22  12:30    


 


Calcium  8.5 mg/dL (8.4-10.2)   05/27/22  05:23    


 


Total Bilirubin  0.30 mg/dL (0.1-1.2)   05/26/22  19:41    


 


AST  15 units/L (5-40)   05/26/22  19:41    


 


ALT  15 units/L (7-56)   05/26/22  19:41    


 


Alkaline Phosphatase  94 units/L ()   05/26/22  19:41    


 


Troponin T  < 0.010 ng/mL (0.00-0.029)   05/26/22  19:41    


 


Total Protein  7.0 g/dL (6.3-8.2)   05/26/22  19:41    


 


Albumin  3.9 g/dL (3.9-5)   05/26/22  19:41    


 


Albumin/Globulin Ratio  1.3 %  05/26/22  19:41    


 


Triglycerides  151 mg/dL (2-149)  H  05/28/22  12:30    


 


Cholesterol  114 mg/dL ()   05/28/22  12:30    


 


LDL Cholesterol Direct  67 mg/dL ()   05/28/22  12:30    


 


HDL Cholesterol  31 mg/dL (40-59)  L  05/28/22  12:30    


 


Cholesterol/HDL Ratio  3.67 %  05/28/22  12:30    


 


Vitamin B12  373.7 pg/mL (211-911)   05/28/22  12:30    


 


TSH  1.130 mlU/mL (0.270-4.200)   05/28/22  12:30    


 


Urine Color  Straw  (Yellow)   05/27/22  09:00    


 


Urine Turbidity  Clear  (Clear)   05/27/22  09:00    


 


Urine pH  5.0  (5.0-7.0)   05/27/22  09:00    


 


Ur Specific Gravity  1.012  (1.003-1.030)   05/27/22  09:00    


 


Urine Protein  <15 mg/dl mg/dL (Negative)   05/27/22  09:00    


 


Urine Glucose (UA)  >=500 mg/dL (Negative)   05/27/22  09:00    


 


Urine Ketones  Neg mg/dL (Negative)   05/27/22  09:00    


 


Urine Blood  Neg  (Negative)   05/27/22  09:00    


 


Urine Nitrite  Neg  (Negative)   05/27/22  09:00    


 


Urine Bilirubin  Neg  (Negative)   05/27/22  09:00    


 


Urine Urobilinogen  < 2.0 mg/dL (<2.0)   05/27/22  09:00    


 


Ur Leukocyte Esterase  Neg  (Negative)   05/27/22  09:00    


 


Urine WBC (Auto)  < 1.0 /HPF (0.0-6.0)   05/27/22  09:00    


 


Urine RBC (Auto)  < 1.0 /HPF (0.0-6.0)   05/27/22  09:00    


 


U Epithel Cells (Auto)  3.0 /HPF (0-13.0)   05/27/22  09:00    











Microbiology: 


Microbiology





05/26/22 20:47   Peripheral/Venous   Blood Culture - Preliminary


                            NO GROWTH AFTER 72 HOURS


05/26/22 20:47   Peripheral/Venous   Blood Culture - Preliminary


                            NO GROWTH AFTER 72 HOURS








[place]/IV: 


                                        





Voiding Method                   External Female Catheter











Active Medications





- Current Medications


Current Medications: 














Generic Name Dose Route Start Last Admin





  Trade Name Freq  PRN Reason Stop Dose Admin


 


Acetaminophen  650 mg  05/26/22 20:55  05/26/22 21:37





  Acetaminophen 325 Mg Tab  PO   650 mg





  Q4H PRN   Administration





  Pain MILD(1-3)/Fever >100.5/HA  


 


Albuterol  2.5 mg  05/26/22 20:55 





  Albuterol 2.5 Mg/3 Ml Nebu  IH  





  Q4HRT PRN  





  Shortness Of Breath  


 


Allopurinol  300 mg  05/27/22 10:00  05/30/22 09:08





  Allopurinol 300 Mg Tab  PO   300 mg





  QDAY KARLIE   Administration


 


Amlodipine Besylate  10 mg  05/27/22 10:00  05/30/22 09:08





  Amlodipine 10 Mg Tab  PO   10 mg





  DAILY KARLIE   Administration


 


Aspirin  325 mg  05/28/22 10:00  05/30/22 09:08





  Aspirin Ec 325 Mg Tab  PO   325 mg





  QDAY KARLIE   Administration


 


Atorvastatin Calcium  40 mg  05/28/22 22:00  05/29/22 22:30





  Atorvastatin 40 Mg Tab  PO   40 mg





  QHS KARLIE   Administration


 


Dextrose  50 ml  05/26/22 20:58 





  Dextrose 50% In Water (25gm) 50 Ml Syringe  IV  





  Q30MIN PRN  





  Hypoglycemia  





  Protocol  


 


Hydromorphone HCl  0.5 mg  05/26/22 20:55 





  Hydromorphone 0.5 Mg/0.5 Ml Inj  IV  





  Q23H PRN  





  Pain , Severe (7-10)  


 


Sodium Chloride  1,000 mls @ 75 mls/hr  05/26/22 21:00  05/30/22 11:33





  Nacl 0.9% 1000 Ml  IV   75 mls/hr





  AS DIRECT KARLIE   Administration


 


Levofloxacin/Dextrose  500 mg in 100 mls @ 100 mls/hr  05/26/22 21:00  05/30/22 

03:56





  Levaquin 500mg/100ml  IV  05/30/22 23:59  Infused





  Q24H KARLIE   Infusion





  Protocol  


 


Insulin Human Lispro  0 unit  05/27/22 00:00  05/30/22 11:31





  Insulin Lispro 100 Unit/Ml  SUB-Q   2 unit





  Q6HR KARLIE   Administration





  Protocol  


 


Lisinopril  20 mg  05/27/22 10:00  05/30/22 09:08





  Lisinopril 20 Mg Tab  PO   20 mg





  QDAY KARLIE   Administration


 


Ondansetron HCl  4 mg  05/26/22 20:55  05/26/22 21:35





  Ondansetron 4 Mg/2 Ml Inj  IV   4 mg





  Q8H PRN   Administration





  Nausea And Vomiting  


 


Oxycodone/Acetaminophen  1 tab  05/26/22 20:55 





  Oxycodone /Acetaminophen 5-325mg Tab  PO  





  Q16H PRN  





  Pain, Moderate (4-6)  


 


Sodium Chloride  10 ml  05/26/22 22:00  05/30/22 09:09





  Sodium Chloride 0.9% 10 Ml Flush Syringe  IV   10 ml





  BID KRALIE   Administration


 


Sodium Chloride  10 ml  05/26/22 20:55 





  Sodium Chloride 0.9% 10 Ml Flush Syringe  IV  





  PRN PRN  





  LINE FLUSH

## 2022-05-31 RX ADMIN — INSULIN LISPRO SCH UNIT: 100 INJECTION, SOLUTION INTRAVENOUS; SUBCUTANEOUS at 13:37

## 2022-05-31 RX ADMIN — SODIUM CHLORIDE SCH MLS/HR: 0.9 INJECTION, SOLUTION INTRAVENOUS at 09:30

## 2022-05-31 RX ADMIN — Medication SCH ML: at 09:34

## 2022-05-31 RX ADMIN — Medication SCH ML: at 22:19

## 2022-05-31 RX ADMIN — ASPIRIN SCH MG: 325 TABLET, COATED ORAL at 09:31

## 2022-05-31 RX ADMIN — INSULIN LISPRO SCH: 100 INJECTION, SOLUTION INTRAVENOUS; SUBCUTANEOUS at 18:38

## 2022-05-31 RX ADMIN — LISINOPRIL SCH MG: 20 TABLET ORAL at 09:32

## 2022-05-31 RX ADMIN — INSULIN LISPRO SCH UNIT: 100 INJECTION, SOLUTION INTRAVENOUS; SUBCUTANEOUS at 09:33

## 2022-05-31 RX ADMIN — SODIUM CHLORIDE SCH MLS/HR: 0.9 INJECTION, SOLUTION INTRAVENOUS at 22:19

## 2022-05-31 RX ADMIN — INSULIN LISPRO SCH UNIT: 100 INJECTION, SOLUTION INTRAVENOUS; SUBCUTANEOUS at 22:19

## 2022-05-31 NOTE — CONSULTATION
History of Present Illness





- Reason for Consult


Consult date: 05/31/22


ICA stenosis





- History of Present Illness


77 YO Female with CVA with RHP on Antiplatelet therapy, HTN, HLD, Gout, Vascular

Dementia, Cerebral Atherosclerosis, DM presents ED for evaluation.  Patient has 

diminished cognition and is unable to provide detailed history.  Patient history

provided by patient caregivers who are at bedside during exam and interview.  As

per caregiver the patient was found to have increased weakness and confusion 

today.  Caregivers reported that they suspected the patient may have had another

stroke.  Patient transported to [facility] via private vehicle for the care and 

evaluation of the aforementioned symptoms.  The patient was seen and evaluated 

in the emergency department.  All lab and imaging studies reviewed.  Patient 

found to have urinary tract infection complicated by systemic inflammatory 

response syndrome, metabolic encephalopathy, as well as volume depletion.  

Patient initiated on CVA protocol without evidence of new CVA.  Teleneurology 

consulted.  No reports of fever, chills, chest pain, palpitation, productive 

cough, skin rash, recent contact, known exposure to COVID-19.  No prior 

admission for review.  All medication listed at time of admission as reconciled.

 Advanced care planning conducted in ED.  Patient has diminished cognition but 

has a positive gag reflex and is able to protect her airway without difficulty 

at the time my evaluation.





Vascular consulted for left ICA stenosis.





Patient has barely 50% left ICA stenosis.  Left thalamic stroke noted.  Patient 

is alert and oriented x3 and denies any sensory defect and has full 5 out of 5 

motor function.  She has a right lateral gaze which, the son stated, is due to 

an ophthalmologic issue and is being addressed as an outpatient.  She knows her 

age, name, and where she is.











Past History


Past Medical History: diabetes, hypertension, hyperlipidemia, stroke


Past Surgical History: hysterectomy


Social history: .  denies: smoking, alcohol abuse, prescription drug 

abuse


Family history: diabetes, hypertension





Medications and Allergies


                                    Allergies











Allergy/AdvReac Type Severity Reaction Status Date / Time


 


No Known Allergies Allergy   Verified 07/07/15 10:51











                                Home Medications











 Medication  Instructions  Recorded  Confirmed  Last Taken  Type


 


Aspirin [Aspirin BABY CHEW TAB] 81 mg DAILY 01/25/21 05/30/22 05/25/22 History


 


Metformin HCl [metFORMIN  mg PO BID 01/25/21 05/30/22 05/25/22 History





Osmotic]     


 


lisinopriL [Zestril TAB] 20 mg PO QDAY 01/25/21 05/30/22 05/25/22 History


 


ALBUTEROL NEB's [Proventil 0.083% 2.5 mg IH Q3HRT PRN  nebu 01/28/21 05/30/22 05/25/22 Rx





NEBS]     


 


Amlodipine Besylate [Norvasc] 10 mg PO DAILY #30 tab 01/28/21 05/30/22 05/25/22 

Rx


 


AtorvaSTATin 10 mg PO QHS #30 tab 01/28/21 05/30/22 05/25/22 Rx


 


allopurinoL [Zyloprim] 300 mg PO QDAY #30 tab 01/28/21 05/30/22 05/25/22 Rx











Active Meds: 


Active Medications





Acetaminophen (Acetaminophen 325 Mg Tab)  650 mg PO Q4H PRN


   PRN Reason: Pain MILD(1-3)/Fever >100.5/HA


   Last Admin: 05/26/22 21:37 Dose:  650 mg


   


Albuterol (Albuterol 2.5 Mg/3 Ml Nebu)  2.5 mg IH Q4HRT PRN


   PRN Reason: Shortness Of Breath


Allopurinol (Allopurinol 300 Mg Tab)  300 mg PO QDAY [facility]


   Last Admin: 05/31/22 09:31 Dose:  300 mg


   


Amlodipine Besylate (Amlodipine 10 Mg Tab)  10 mg PO DAILY [facility]


   Last Admin: 05/31/22 09:32 Dose:  10 mg


   


Aspirin (Aspirin Ec 325 Mg Tab)  325 mg PO QDAY [facility]


   Last Admin: 05/31/22 09:31 Dose:  325 mg


   


Atorvastatin Calcium (Atorvastatin 40 Mg Tab)  40 mg PO QHS [facility]


   Last Admin: 05/30/22 21:37 Dose:  40 mg


   


Dextrose (Dextrose 50% In Water (25gm) 50 Ml Syringe)  50 ml IV Q30MIN PRN; Pr

otocol


   PRN Reason: Hypoglycemia


Enoxaparin Sodium (Enoxaparin 40 Mg/0.4 Ml Inj)  40 mg SUB-Q QDAY@2200 [facility]; 

Protocol


Hydromorphone HCl (Hydromorphone 0.5 Mg/0.5 Ml Inj)  0.5 mg IV Q23H PRN


   PRN Reason: Pain , Severe (7-10)


Sodium Chloride (Nacl 0.9% 1000 Ml)  1,000 mls @ 75 mls/hr IV AS DIRECT [facility]


   Last Admin: 05/31/22 09:30 Dose:  75 mls/hr


   


Insulin Human Lispro (Insulin Lispro 100 Unit/Ml)  0 unit SUB-Q ACHS [facility]; 

Protocol


   Last Admin: 05/31/22 09:33 Dose:  2 unit


   


Lisinopril (Lisinopril 20 Mg Tab)  20 mg PO QDAY [facility]


   Last Admin: 05/31/22 09:32 Dose:  20 mg


   


Ondansetron HCl (Ondansetron 4 Mg/2 Ml Inj)  4 mg IV Q8H PRN


   PRN Reason: Nausea And Vomiting


   Last Admin: 05/26/22 21:35 Dose:  4 mg


   


Oxycodone/Acetaminophen (Oxycodone /Acetaminophen 5-325mg Tab)  1 tab PO Q16H 

PRN


   PRN Reason: Pain, Moderate (4-6)


Sodium Chloride (Sodium Chloride 0.9% 10 Ml Flush Syringe)  10 ml IV BID [facility]


   Last Admin: 05/31/22 09:34 Dose:  10 ml


   


Sodium Chloride (Sodium Chloride 0.9% 10 Ml Flush Syringe)  10 ml IV PRN PRN


   PRN Reason: LINE FLUSH











Exam





- Constitutional


Vitals: 


                                        











Temp Pulse Resp BP Pulse Ox


 


 98.2 F   80   20   149/71   95 


 


 05/31/22 05:15  05/31/22 05:15  05/31/22 05:15  05/31/22 05:15  05/31/22 05:15














Results





- Labs


CBC & Chem 7: 


                                 05/27/22 05:23





                                 05/27/22 05:23


Labs: 


                              Abnormal lab results











  05/30/22 05/30/22 05/31/22 Range/Units





  17:37 21:59 05:45 


 


POC Glucose  180 H  147 H  166 H  ()  mg/dL














Assessment and Plan





77-year-old female with multiple medical issues who is admitted for left 

thalamic stroke.





Found to have left ICA 50% stenosis.





Thalamic strokes are typically microvascular due to small vessel disease.  There

 is no large vessel stroke distribution noted.





No thrombotic component of left ICA stenosis noted on CT scan.





Recommend increasing antiplatelet therapy (previously aspirin, recommend 

consideration for transition to Plavix), recommend continuing high-dose statin 

therapy.





Patient will follow up in 4 to 6 weeks as outpatient with repeat ultrasound to 

continue following this area.  If the area worsens, may consider endarterectomy.

  Will obtain a carotid ultrasound for baseline today.  Discussed with patient 

and son.

## 2022-05-31 NOTE — PROGRESS NOTE
Assessment and Plan


Assessment and plan: 


75 YO Female with CVA with RHP on Antiplatelet therapy, HTN, HLD, Gout, Vascular

Dementia, Cerebral Atherosclerosis, DM presents ED for evaluation of diminished 

cognition and was unable to provide detailed history.  Patient's history 

provided by patient caregivers who are at bedside during exam and interview.  As

per caregiver the patient was found to have increased weakness and confusion 

today.  Caregivers reported that they suspected the patient may have had another

stroke.  Patient transported to [facility] via private vehicle for the care and 

evaluation of the aforementioned symptoms.  The patient was seen and evaluated 

in the emergency department.  The patient was admitted with diagnosis below:





Acute left thalamic CVA. 


Continue aspirin and statin, physical therapy occupational therapy 

rehabilitation


Neurology following, home with home health upon discharge





SIRS.  Patient meets criteria given the tachycardia, leukocytosis and altered 

mentation.  No signs of infection





Diabetes mellitus type 2





Hypertension.





Metabolic encephalopathy





Vascular dementia





Cerebral atherosclerosis





Left internal carotid artery stenosis ;


In the setting of left thalamic stroke


Vascular/IR evaluated the patient


Recommend aggressive management with antiplatelet therapy and high-dose statin 

therapy and follow-up carotid Doppler after 4 to 6 weeks, follow-up with 

vascular. If carotid stenosis worsens consider carotid endarterectomy for left 

internal carotid artery stenosis





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  

On CTA head


Outpatient neurosurgery follow-up for further evaluation and management upon 

discharge





CTA head; circulation directed superiorly near the origin of the left callosal 

marginal artery measuring 2 to 3 mm sagittally compatible with small aneurysm no

further clear CT evidence of intracranial aneurysm


neurology recommend outpatient neurosurgical evaluation upon discharge





Discharge planning; PT recommend home health PT and rolling walker, saturating 

well on room air


Possible discharge home tomorrow if stable





Brief history and daily Hospital course;


5/27/2022.  Urinalysis does not reveal any signs of infection.  We will follow-

up blood cultures for further evaluation.  Continue empiric antibiotics for now.

 We will proceed with further follow-up and rule out CVA.  Check MRI of brain.  

Patient appears to be back to her baseline mental status.  ?  TIA.





5/28/2022.  MRI reveals acute anterior left thalamic CVA measuring 1.4 cm.  We w

ill initiate aspirin and Lipitor for secondary prevention.  Await echocardiogram

results to rule out thromboembolic source.  Neurology has been consulted.  Await

physical therapy evaluation.  Patient's BG has been stable not requiring home 

Lantus insulin.  We will continue sliding scale regular insulin and monitor 

closely





5/29/2022.  Neurology evaluated the patient and recommends CTA of the head and 

neck.  Await PT evaluation for disposition.  Continue aspirin and Lipitor.





5/30/2022.  CT of the head and neck reveals extensive irregularity involving 

intracranial vessels most consistent with diffuse atherosclerotic disease.  

Findings also include calcification involving intracranial ICAs with moderate to

marked segmental stenosis greatest on the right.  There is occlusion of the P2 

segment of the right PCA with old right PCA infarct and encephalomalacia 

involving occipital lobe.  Patient also has atherosclerotic calcification 

involving proximal left ICA with 50% stenosis.  Mild plaque involving right 

carotid bifurcation with significant stenosis.  Neurology recommends 

neurosurgery consultation.  We will also consult vascular for further 

evaluation.  Continue aspirin and Lipitor.  Physical therapy recommends home 

health PT and rolling walker





5/31/2022; possible discharge tomorrow with home health PT and rolling walker











History


Interval history: 


I have seen and examined the patient at the bedside


Patient's chart and medications reviewed


Patient feels slightly better


No new complaints


Vascular evaluation recommendation noted and appreciated


Recommend high-dose statin and antiplatelets Plavix


Repeat carotid Doppler and follow-up with vascular in 4 to 6 weeks


If worsening carotid stenosis, consider carotid endarterectomy





Hospitalist Physical





- Constitutional


Vitals: 


                                        











Temp Pulse Resp BP Pulse Ox


 


 98.2 F   80   20   149/71   95 


 


 05/31/22 05:15  05/31/22 05:15  05/31/22 05:15  05/31/22 05:15  05/31/22 05:15











General appearance: Present: no acute distress, well-nourished





- EENT


Eyes: Present: PERRL, EOM intact





- Neck


Neck: Present: supple, normal ROM





- Respiratory


Respiratory effort: normal


Respiratory: bilateral: diminished, negative: rales, rhonchi, wheezing





- Cardiovascular


Rhythm: regular


Heart Sounds: Present: S1 & S2





- Extremities


Extremities: no ischemia, No edema





- Abdominal


General gastrointestinal: soft, non-tender, non-distended, normal bowel sounds





- Integumentary


Integumentary: Present: clear, warm





- Psychiatric


Psychiatric: appropriate mood/affect, cooperative





- Neurologic


Neurologic: moves all extremities (Acute CVA with residual weakness)





HEART Score





- HEART Score


Troponin: 


                                        











Troponin T  < 0.010 ng/mL (0.00-0.029)   05/26/22  19:41    














Results





- Labs


CBC & Chem 7: 


                                 05/27/22 05:23





                                 05/27/22 05:23


Labs: 


                             Laboratory Last Values











WBC  10.4 K/mm3 (4.5-11.0)   05/27/22  05:23    


 


RBC  4.95 M/mm3 (3.65-5.03)   05/27/22  05:23    


 


Hgb  13.1 gm/dl (10.1-14.3)   05/27/22  05:23    


 


Hct  41.0 % (30.3-42.9)   05/27/22  05:23    


 


MCV  83 fl (79-97)   05/27/22  05:23    


 


MCH  27 pg (28-32)  L  05/27/22  05:23    


 


MCHC  32 % (30-34)   05/27/22  05:23    


 


RDW  14.2 % (13.2-15.2)   05/27/22  05:23    


 


Plt Count  247 K/mm3 (140-440)   05/27/22  05:23    


 


Lymph % (Auto)  18.9 % (13.4-35.0)   05/27/22  05:23    


 


Mono % (Auto)  7.2 % (0.0-7.3)   05/27/22  05:23    


 


Eos % (Auto)  2.9 % (0.0-4.3)   05/27/22  05:23    


 


Baso % (Auto)  0.5 % (0.0-1.8)   05/27/22  05:23    


 


Lymph # (Auto)  2.0 K/mm3 (1.2-5.4)   05/27/22  05:23    


 


Mono # (Auto)  0.8 K/mm3 (0.0-0.8)   05/27/22  05:23    


 


Eos # (Auto)  0.3 K/mm3 (0.0-0.4)   05/27/22  05:23    


 


Baso # (Auto)  0.0 K/mm3 (0.0-0.1)   05/27/22  05:23    


 


Seg Neutrophils %  70.5 % (40.0-70.0)  H  05/27/22  05:23    


 


Seg Neutrophils #  7.3 K/mm3 (1.8-7.7)   05/27/22  05:23    


 


PT  13.8 Sec. (12.2-14.9)   05/26/22  19:41    


 


INR  0.96  (0.87-1.13)   05/26/22  19:41    


 


APTT  31.5 Sec. (24.2-36.6)   05/26/22  19:41    


 


Thrombin Time  21.1 Sec. (15.1-19.6)  H  05/26/22  19:41    


 


Sodium  144 mmol/L (137-145)   05/27/22  05:23    


 


Potassium  4.7 mmol/L (3.6-5.0)   05/27/22  05:23    


 


Chloride  112.8 mmol/L ()  H  05/27/22  05:23    


 


Carbon Dioxide  22 mmol/L (22-30)   05/27/22  05:23    


 


Anion Gap  14 mmol/L  05/27/22  05:23    


 


BUN  29 mg/dL (7-17)  H  05/27/22  05:23    


 


Creatinine  1.0 mg/dL (0.6-1.2)   05/27/22  05:23    


 


Estimated GFR  > 60 ml/min  05/27/22  05:23    


 


BUN/Creatinine Ratio  29 %  05/27/22  05:23    


 


Glucose  135 mg/dL ()  H  05/27/22  05:23    


 


POC Glucose  166 mg/dL ()  H  05/31/22  05:45    


 


Hemoglobin A1c  7.5 % (4-6)  H  05/28/22  12:30    


 


Calcium  8.5 mg/dL (8.4-10.2)   05/27/22  05:23    


 


Total Bilirubin  0.30 mg/dL (0.1-1.2)   05/26/22  19:41    


 


AST  15 units/L (5-40)   05/26/22  19:41    


 


ALT  15 units/L (7-56)   05/26/22  19:41    


 


Alkaline Phosphatase  94 units/L ()   05/26/22  19:41    


 


Troponin T  < 0.010 ng/mL (0.00-0.029)   05/26/22  19:41    


 


Total Protein  7.0 g/dL (6.3-8.2)   05/26/22  19:41    


 


Albumin  3.9 g/dL (3.9-5)   05/26/22  19:41    


 


Albumin/Globulin Ratio  1.3 %  05/26/22  19:41    


 


Triglycerides  151 mg/dL (2-149)  H  05/28/22  12:30    


 


Cholesterol  114 mg/dL ()   05/28/22  12:30    


 


LDL Cholesterol Direct  67 mg/dL ()   05/28/22  12:30    


 


HDL Cholesterol  31 mg/dL (40-59)  L  05/28/22  12:30    


 


Cholesterol/HDL Ratio  3.67 %  05/28/22  12:30    


 


Vitamin B12  373.7 pg/mL (211-911)   05/28/22  12:30    


 


TSH  1.130 mlU/mL (0.270-4.200)   05/28/22  12:30    


 


Urine Color  Straw  (Yellow)   05/27/22  09:00    


 


Urine Turbidity  Clear  (Clear)   05/27/22  09:00    


 


Urine pH  5.0  (5.0-7.0)   05/27/22  09:00    


 


Ur Specific Gravity  1.012  (1.003-1.030)   05/27/22  09:00    


 


Urine Protein  <15 mg/dl mg/dL (Negative)   05/27/22  09:00    


 


Urine Glucose (UA)  >=500 mg/dL (Negative)   05/27/22  09:00    


 


Urine Ketones  Neg mg/dL (Negative)   05/27/22  09:00    


 


Urine Blood  Neg  (Negative)   05/27/22  09:00    


 


Urine Nitrite  Neg  (Negative)   05/27/22  09:00    


 


Urine Bilirubin  Neg  (Negative)   05/27/22  09:00    


 


Urine Urobilinogen  < 2.0 mg/dL (<2.0)   05/27/22  09:00    


 


Ur Leukocyte Esterase  Neg  (Negative)   05/27/22  09:00    


 


Urine WBC (Auto)  < 1.0 /HPF (0.0-6.0)   05/27/22  09:00    


 


Urine RBC (Auto)  < 1.0 /HPF (0.0-6.0)   05/27/22  09:00    


 


U Epithel Cells (Auto)  3.0 /HPF (0-13.0)   05/27/22  09:00    











Microbiology: 


Microbiology





05/26/22 20:47   Peripheral/Venous   Blood Culture - Preliminary


                            NO GROWTH AFTER 4 DAYS


05/26/22 20:47   Peripheral/Venous   Blood Culture - Preliminary


                            NO GROWTH AFTER 4 DAYS








[place]/IV: 


                                        





Voiding Method                   Toilet











Active Medications





- Current Medications


Current Medications: 














Generic Name Dose Route Start Last Admin





  Trade Name Freq  PRN Reason Stop Dose Admin


 


Acetaminophen  650 mg  05/26/22 20:55  05/26/22 21:37





  Acetaminophen 325 Mg Tab  PO   650 mg





  Q4H PRN   Administration





  Pain MILD(1-3)/Fever >100.5/HA  


 


Albuterol  2.5 mg  05/26/22 20:55 





  Albuterol 2.5 Mg/3 Ml Nebu  IH  





  Q4HRT PRN  





  Shortness Of Breath  


 


Allopurinol  300 mg  05/27/22 10:00  05/30/22 09:08





  Allopurinol 300 Mg Tab  PO   300 mg





  QDAY KARLIE   Administration


 


Amlodipine Besylate  10 mg  05/27/22 10:00  05/30/22 09:08





  Amlodipine 10 Mg Tab  PO   10 mg





  DAILY KARLIE   Administration


 


Aspirin  325 mg  05/28/22 10:00  05/30/22 09:08





  Aspirin Ec 325 Mg Tab  PO   325 mg





  QDAY KARLIE   Administration


 


Atorvastatin Calcium  40 mg  05/28/22 22:00  05/30/22 21:37





  Atorvastatin 40 Mg Tab  PO   40 mg





  QHS KARLIE   Administration


 


Dextrose  50 ml  05/26/22 20:58 





  Dextrose 50% In Water (25gm) 50 Ml Syringe  IV  





  Q30MIN PRN  





  Hypoglycemia  





  Protocol  


 


Hydromorphone HCl  0.5 mg  05/26/22 20:55 





  Hydromorphone 0.5 Mg/0.5 Ml Inj  IV  





  Q23H PRN  





  Pain , Severe (7-10)  


 


Sodium Chloride  1,000 mls @ 75 mls/hr  05/26/22 21:00  05/30/22 11:33





  Nacl 0.9% 1000 Ml  IV   75 mls/hr





  AS DIRECT KARLIE   Administration


 


Insulin Human Lispro  0 unit  05/30/22 22:00  05/30/22 22:00





  Insulin Lispro 100 Unit/Ml  SUB-Q   Not Given





  ACHS KARLIE  





  Protocol  


 


Lisinopril  20 mg  05/27/22 10:00  05/30/22 09:08





  Lisinopril 20 Mg Tab  PO   20 mg





  QDAY KARLIE   Administration


 


Ondansetron HCl  4 mg  05/26/22 20:55  05/26/22 21:35





  Ondansetron 4 Mg/2 Ml Inj  IV   4 mg





  Q8H PRN   Administration





  Nausea And Vomiting  


 


Oxycodone/Acetaminophen  1 tab  05/26/22 20:55 





  Oxycodone /Acetaminophen 5-325mg Tab  PO  





  Q16H PRN  





  Pain, Moderate (4-6)  


 


Sodium Chloride  10 ml  05/26/22 22:00  05/30/22 21:37





  Sodium Chloride 0.9% 10 Ml Flush Syringe  IV   10 ml





  BID KARLIE   Administration


 


Sodium Chloride  10 ml  05/26/22 20:55 





  Sodium Chloride 0.9% 10 Ml Flush Syringe  IV  





  PRN PRN  





  LINE FLUSH

## 2022-05-31 NOTE — VASCULAR LAB REPORT
DUPLEX DOPPLER ULTRASOUND CAROTID, BILATERAL



INDICATION / CLINICAL INFORMATION: left ica stenosis.



COMPARISON: CTA neck 5/28/2022.



FINDINGS:



RIGHT CAROTID: Mild atherosclerotic plaque. Mild intimal thickening.

- PLAQUE ESTIMATE (%): < 50%

- CCA velocity: 85 cm/sec.

- ICA peak systolic velocity: 102 cm/sec.

- ICA/CCA PSV Ratio: Less than 2.



Right Vertebral Artery: Antegrade flow.



LEFT CAROTID: Moderate atherosclerotic plaque and moderate intimal thickening.

- PLAQUE ESTIMATE (%): >50%

- CCA velocity: 96 cm/sec.

- ICA peak systolic velocity: 129 cm/sec.

- ICA/CCA PSV Ratio: Less than 2.



Left Vertebral Artery: Antegrade flow.



IMPRESSION:

1. Right Internal Carotid Artery: Less than 50% diameter stenosis.

2. Left Internal Carotid Artery: 50-69%  diameter stenosis.







Velocity criteria are extrapolated from diameter data as defined by the Society of Radiologists in Ul
trasound Consensus Conference, Radiology 2003; 229;340-346.

=======================================================

NO STENOSIS (NORMAL)

     - Plaque = none; ICA PSV < 125 cm/sec; ICA/CCA PSV Ratio < 2.0

<50% STENOSIS

     - Plaque < 50%; ICA PSV < 125 cm/sec; ICA/CCA PSV Ratio < 2.0

50-69% STENOSIS

     - Plaque > 50%; ICA PSV = 125-230 cm/sec; ICA/CCA PSV Ratio = 2.0-4.0

>70% BUT <100% STENOSIS

     - Plaque > 50%; ICA PSV > 230 cm/sec; ICA/CCA PSV Ratio > 4.0

NEAR OCCLUSION

     - Plaque = visible lumen; ICA PSV = high/low/none; ICA/CCA PSV Ratio = variable

TOTAL OCCLUSION

     - Plaque = no lumen; ICA PSV = none; ICA/CCA PSV Ratio = N/A

======================================================



Scribed by: Yisel Love RDMS, RVT, YUMIKS 

Scribed: 5/31/2022 1:24 PM 



 I have reviewed the images, agree with this report, and edited this report as needed. Chest from



Signer Name: Darin Ty MD 

Signed: 5/31/2022 2:33 PM

Workstation Name: [id number]

## 2022-06-01 VITALS — DIASTOLIC BLOOD PRESSURE: 68 MMHG | SYSTOLIC BLOOD PRESSURE: 145 MMHG

## 2022-06-01 RX ADMIN — Medication SCH ML: at 09:24

## 2022-06-01 RX ADMIN — INSULIN LISPRO SCH UNIT: 100 INJECTION, SOLUTION INTRAVENOUS; SUBCUTANEOUS at 17:20

## 2022-06-01 RX ADMIN — INSULIN LISPRO SCH UNIT: 100 INJECTION, SOLUTION INTRAVENOUS; SUBCUTANEOUS at 07:52

## 2022-06-01 RX ADMIN — LISINOPRIL SCH MG: 20 TABLET ORAL at 09:16

## 2022-06-01 RX ADMIN — INSULIN LISPRO SCH UNIT: 100 INJECTION, SOLUTION INTRAVENOUS; SUBCUTANEOUS at 12:24

## 2022-06-01 NOTE — DISCHARGE SUMMARY
Providers





- Providers


Date of Admission: 


05/26/22 20:55





Date of discharge: 06/01/22


Attending physician: 


AMY J KOCHERLA





                                        





05/27/22 11:00


Physical Therapy Evaluation and Treat [CONS] Routine 


   Comment: 


   Reason For Exam: CVA





05/28/22 09:34


Consult to Physician [CONS] Routine 


   Comment: 


   Consulting Provider: PEDRO GARCIA


   Physician Instructions: 


   Reason For Exam: CVA





05/30/22 12:21


Consult to Physician [CONS] Routine 


   Comment: 


   Consulting Provider: ROBI NORTON


   Physician Instructions: 


   Reason For Exam: CVA, ICA stenosis











Primary care physician: 


GIANNA CARMICHAEL








Hospitalization


Condition: Stable


Hospital course: 


77 YO Female with CVA with RHP on Antiplatelet therapy, HTN, HLD, Gout, Vascular

 Dementia, Cerebral Atherosclerosis, DM presents ED for evaluation of diminished

 cognition and was unable to provide detailed history.  Patient's history 

provided by patient caregivers who are at bedside during exam and interview.  As

 per caregiver the patient was found to have increased weakness and confusion 

today.  Caregivers reported that they suspected the patient may have had another

 stroke.  Patient transported to [facility] via private vehicle for the care and 

evaluation of the aforementioned symptoms.  The patient was seen and evaluated 

in the emergency department.  The patient was admitted with diagnosis below:





Acute left thalamic CVA. 


Continue aspirin and statin, physical therapy occupational therapy 

rehabilitation


Neurology following, home with home health upon discharge





SIRS.  Patient meets criteria given the tachycardia, leukocytosis and altered 

mentation.  No signs of infection





Diabetes mellitus type 2





Hypertension.





Metabolic encephalopathy





Vascular dementia





Cerebral atherosclerosis





Left internal carotid artery stenosis ;


In the setting of left thalamic stroke


Vascular/IR evaluated the patient


Recommend aggressive management with antiplatelet therapy and high-dose statin 

therapy and follow-up carotid Doppler after 4 to 6 weeks, follow-up with 

vascular. If carotid stenosis worsens consider carotid endarterectomy for left 

internal carotid artery stenosis





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  

On CTA head


Outpatient neurosurgery follow-up for further evaluation and management upon 

discharge





CTA head; circulation directed superiorly near the origin of the left callosal 

marginal artery measuring 2 to 3 mm sagittally compatible with small aneurysm no

 further clear CT evidence of intracranial aneurysm


neurology recommend outpatient neurosurgical evaluation upon discharge





Discharge planning; PT recommend home health PT and rolling walker, saturating 

well on room air


Possible discharge home tomorrow if stable





Brief history and daily Hospital course;


5/27/2022.  Urinalysis does not reveal any signs of infection.  We will follow-

up blood cultures for further evaluation.  Continue empiric antibiotics for now.

  We will proceed with further follow-up and rule out CVA.  Check MRI of brain. 

 Patient appears to be back to her baseline mental status.  ?  TIA.





5/28/2022.  MRI reveals acute anterior left thalamic CVA measuring 1.4 cm.  We 

will initiate aspirin and Lipitor for secondary prevention.  Await 

echocardiogram results to rule out thromboembolic source.  Neurology has been 

consulted.  Await physical therapy evaluation.  Patient's BG has been stable not

 requiring home Lantus insulin.  We will continue sliding scale regular insulin 

and monitor closely





5/29/2022.  Neurology evaluated the patient and recommends CTA of the head and 

neck.  Await PT evaluation for disposition.  Continue aspirin and Lipitor.





5/30/2022.  CT of the head and neck reveals extensive irregularity involving 

intracranial vessels most consistent with diffuse atherosclerotic disease.  

Findings also include calcification involving intracranial ICAs with moderate to

 marked segmental stenosis greatest on the right.  There is occlusion of the P2 

segment of the right PCA with old right PCA infarct and encephalomalacia 

involving occipital lobe.  Patient also has atherosclerotic calcification 

involving proximal left ICA with 50% stenosis.  Mild plaque involving right 

carotid bifurcation with significant stenosis.  Neurology recommends 

neurosurgery consultation.  We will also consult vascular for further 

evaluation.  Continue aspirin and Lipitor.  Physical therapy recommends home 

health PT and rolling walker





5/31/2022; possible discharge tomorrow with home health PT and rolling walker














Disposition: 06 HOME HEALTH CARE SERVICE


Final Discharge Diagnosis (Prints w/discharge instructions): Acute left thalamic

 CVA with right-sided weakness.  SIRS.  Type 2 diabetes mellitus.  Hypertension.

  Metabolic encephalopathy.  Vascular dementia.  Cerebral atherosclerosis.  Left

 internal carotid artery stenosis.  Incidental finding of intracerebral small.  

Aneurysm


Time spent for discharge: 40 minutes





Core Measure Documentation





- Palliative Care


Palliative Care/ Comfort Measures: Not Applicable





- Core Measures


Any of the following diagnoses?: stroke





- Stroke Discharge Requirements


Statin for LDL = or >70 mg/dl on DC: Yes


Anticoag for atrial fib/atrial flutter: Not Applicable (No evidence of A. fib or

 a flutter)


Antithrombotic for ischemic stroke: Yes





Exam





- Constitutional


Vitals: 


                                        











Temp Pulse Resp BP Pulse Ox


 


 98.2 F   78   22   168/73   98 


 


 06/01/22 06:35  06/01/22 06:35  06/01/22 06:35  06/01/22 06:35  06/01/22 08:35











General appearance: Present: no acute distress, well-nourished





- EENT


Eyes: Present: PERRL, EOM intact





- Neck


Neck: Present: supple, normal ROM





- Respiratory


Respiratory effort: normal


Respiratory: bilateral: diminished, negative: rales, rhonchi, wheezing





- Cardiovascular


Rhythm: regular


Heart Sounds: Present: S1 & S2





- Extremities


Extremities: no ischemia, No edema





- Abdominal


General gastrointestinal: Present: soft, non-tender, non-distended, normal bowel

 sounds





- Integumentary


Integumentary: Present: clear, warm





- Musculoskeletal


Musculoskeletal: generalized weakness





- Psychiatric


Psychiatric: appropriate mood/affect, cooperative





- Neurologic


Neurologic: CNII-XII intact, moves all extremities





Plan


Activity: advance as tolerated, no driving until cleared by PCP, fall pr

ecautions


Diet: other (Cardiac diet)


Additional Instructions: If you have worsening symptoms contact MD or go to the 

nearest emergency room as needed.  Fall precautions.  Advised to follow with 

primary care physician, vascular physician, private neurologist and private 

neurosurgeon per schedule.  Patient advised to get carotid Doppler in 6 weeks pr

ior to seeing vascular surgeon.


Follow up with: 


GIANNA CARMICHAEL MD [Primary Care Provider] - 7 Days


EVAN SANCHEZ MD [Staff Physician] - 6 Weeks


PARMINDER MCNEAL II, MD [Staff Physician] - 14 Days


PREETI YOUSIF MD [Staff Physician] - 7 Days


Prescriptions: 


AtorvaSTATin [Lipitor] 40 mg PO QHS #30 tablet


Amlodipine Besylate [Norvasc] 10 mg PO DAILY #30 tab


Clopidogrel [Plavix] 75 mg PO QDAY #30 tablet


lisinopriL [Zestril TAB] 20 mg PO QDAY #30 tablet